# Patient Record
Sex: FEMALE | Race: WHITE | NOT HISPANIC OR LATINO | Employment: UNEMPLOYED | URBAN - METROPOLITAN AREA
[De-identification: names, ages, dates, MRNs, and addresses within clinical notes are randomized per-mention and may not be internally consistent; named-entity substitution may affect disease eponyms.]

---

## 2018-03-05 DIAGNOSIS — E03.9 HYPOTHYROIDISM, UNSPECIFIED TYPE: Primary | ICD-10-CM

## 2018-03-05 RX ORDER — SPIRONOLACTONE 100 MG/1
100 TABLET, FILM COATED ORAL
COMMUNITY
Start: 2017-10-09 | End: 2018-07-19 | Stop reason: SDUPTHER

## 2018-03-05 RX ORDER — FLUTICASONE PROPIONATE 50 MCG
SPRAY, SUSPENSION (ML) NASAL
COMMUNITY
Start: 2012-06-14 | End: 2021-01-18 | Stop reason: ENTERED-IN-ERROR

## 2018-03-05 RX ORDER — CETIRIZINE HYDROCHLORIDE 10 MG/1
10 TABLET ORAL
COMMUNITY
Start: 2017-10-03 | End: 2019-03-12 | Stop reason: ENTERED-IN-ERROR

## 2018-03-05 RX ORDER — ASPIRIN 81 MG/1
81 TABLET ORAL
COMMUNITY
Start: 2016-01-13 | End: 2018-10-09 | Stop reason: ENTERED-IN-ERROR

## 2018-03-05 RX ORDER — LEVOTHYROXINE SODIUM 175 UG/1
TABLET ORAL
COMMUNITY
Start: 2017-10-09 | End: 2018-03-06 | Stop reason: SDUPTHER

## 2018-03-05 RX ORDER — ACETAMINOPHEN 500 MG
TABLET ORAL
COMMUNITY
Start: 2011-02-23 | End: 2019-03-12 | Stop reason: ENTERED-IN-ERROR

## 2018-03-06 RX ORDER — LEVOTHYROXINE SODIUM 175 UG/1
175 TABLET ORAL DAILY
Qty: 90 TABLET | Refills: 1 | Status: CANCELLED | OUTPATIENT
Start: 2018-03-06

## 2018-03-06 RX ORDER — LEVOTHYROXINE SODIUM 175 UG/1
175 TABLET ORAL DAILY
Qty: 90 TABLET | Refills: 1 | Status: SHIPPED | OUTPATIENT
Start: 2018-03-06 | End: 2018-10-16 | Stop reason: SDUPTHER

## 2018-07-24 ENCOUNTER — TELEPHONE (OUTPATIENT)
Dept: FAMILY MEDICINE | Facility: CLINIC | Age: 53
End: 2018-07-24

## 2018-07-24 NOTE — TELEPHONE ENCOUNTER
Pt LVM, pt needs a refill of medication for spironolactone (ALDACTONE) 100 mg tablet, medication was refill today

## 2018-10-09 ENCOUNTER — OFFICE VISIT (OUTPATIENT)
Dept: FAMILY MEDICINE | Facility: CLINIC | Age: 53
End: 2018-10-09
Payer: COMMERCIAL

## 2018-10-09 VITALS
WEIGHT: 183.4 LBS | TEMPERATURE: 98.7 F | BODY MASS INDEX: 32.5 KG/M2 | DIASTOLIC BLOOD PRESSURE: 72 MMHG | HEIGHT: 63 IN | OXYGEN SATURATION: 98 % | RESPIRATION RATE: 16 BRPM | SYSTOLIC BLOOD PRESSURE: 120 MMHG | HEART RATE: 68 BPM

## 2018-10-09 DIAGNOSIS — Z11.59 NEED FOR HEPATITIS C SCREENING TEST: ICD-10-CM

## 2018-10-09 DIAGNOSIS — Z23 NEED FOR VACCINATION: ICD-10-CM

## 2018-10-09 DIAGNOSIS — L70.8 OTHER ACNE: ICD-10-CM

## 2018-10-09 DIAGNOSIS — M25.551 PAIN OF RIGHT HIP JOINT: ICD-10-CM

## 2018-10-09 DIAGNOSIS — Z00.00 ROUTINE PHYSICAL EXAMINATION: Primary | ICD-10-CM

## 2018-10-09 DIAGNOSIS — E06.3 HYPOTHYROIDISM DUE TO HASHIMOTO'S THYROIDITIS: ICD-10-CM

## 2018-10-09 PROCEDURE — 90471 IMMUNIZATION ADMIN: CPT | Performed by: FAMILY MEDICINE

## 2018-10-09 PROCEDURE — 99396 PREV VISIT EST AGE 40-64: CPT | Mod: 25 | Performed by: FAMILY MEDICINE

## 2018-10-09 PROCEDURE — 90686 IIV4 VACC NO PRSV 0.5 ML IM: CPT | Performed by: FAMILY MEDICINE

## 2018-10-09 ASSESSMENT — ENCOUNTER SYMPTOMS
CHEST TIGHTNESS: 0
PALPITATIONS: 0
SORE THROAT: 0
ABDOMINAL PAIN: 0
MYALGIAS: 0
FEVER: 0
WHEEZING: 0
ARTHRALGIAS: 0
COUGH: 0
NERVOUS/ANXIOUS: 0
EYE PAIN: 0
DIZZINESS: 0
SINUS PRESSURE: 0
RHINORRHEA: 0
SINUS PAIN: 0
FATIGUE: 0
CHILLS: 0
HEMATURIA: 0
DIARRHEA: 0
WEAKNESS: 0
CONSTIPATION: 0
SHORTNESS OF BREATH: 0
FREQUENCY: 0
BRUISES/BLEEDS EASILY: 0
HEADACHES: 0

## 2018-10-09 NOTE — PROGRESS NOTES
Subjective      Patient ID: Arelis Chin is a 52 y.o. female.    She is here for PE. She has been doing ok overall. She is trying to follow well balanced meals and having hard time losing weight and thinks she it is related to her hormones but cannot stop her oral hormones. She is also planning to schedule colonoscopy in near future as well. She is sleeping 7hrs per night. Normal BMs and urination. UTD with eye and dental exam. Due for labs. Would like flu shot.         The following have been reviewed and updated as appropriate in this visit:  Allergies  Meds  Problems       Review of Systems   Constitutional: Negative for chills, fatigue and fever.        Weight gain   HENT: Negative for ear pain, postnasal drip, rhinorrhea, sinus pain, sinus pressure and sore throat.    Eyes: Negative for pain and visual disturbance.   Respiratory: Negative for cough, chest tightness, shortness of breath and wheezing.    Cardiovascular: Negative for chest pain and palpitations.   Gastrointestinal: Negative for abdominal pain, constipation and diarrhea.   Genitourinary: Negative for decreased urine volume, frequency and hematuria.   Musculoskeletal: Negative for arthralgias and myalgias.   Skin: Negative for rash.   Neurological: Negative for dizziness, weakness and headaches.   Hematological: Does not bruise/bleed easily.   Psychiatric/Behavioral: Negative for behavioral problems. The patient is not nervous/anxious.        Current Outpatient Prescriptions   Medication Sig Dispense Refill   • cetirizine (ZyrTEC) 10 mg tablet 10 mg.     • cholecalciferol, vitamin D3, (VITAMIN D3) 5,000 unit tablet take 1 capsule as needed     • conj estrog-medroxyprogest ace (PREMPRO) 0.45-1.5 mg per tablet take 1 tablet by oral route  every day     • fluticasone (FLONASE) 50 mcg/actuation nasal spray spray 2 spray by intranasal route  every day in each nostril- PRN     • levothyroxine (SYNTHROID) 175 mcg tablet Take 1 tablet (175 mcg  "total) by mouth daily. 90 tablet 1   • spironolactone (ALDACTONE) 100 mg tablet TAKE 1 TABLET EVERY DAY 90 tablet 0     No current facility-administered medications for this visit.      History reviewed. No pertinent past medical history.  History reviewed. No pertinent family history.  Past Surgical History:   Procedure Laterality Date   • LASER ABLATION OF THE CERVIX       Social History     Social History   • Marital status:      Spouse name: N/A   • Number of children: N/A   • Years of education: N/A     Occupational History   • Not on file.     Social History Main Topics   • Smoking status: Never Smoker   • Smokeless tobacco: Never Used   • Alcohol use Yes      Comment: 2-3 weely    • Drug use: Unknown   • Sexual activity: Not on file     Other Topics Concern   • Not on file     Social History Narrative   • No narrative on file     Allergies   Allergen Reactions   • Hydromorphone      Other reaction(s): eye tearing   • Sulfamethoxazole Hives   • Trimethoprim Hives       Objective   /72 (BP Location: Right upper arm, Patient Position: Sitting)   Pulse 68   Temp 37.1 °C (98.7 °F) (Oral)   Resp 16   Ht 1.6 m (5' 3\")   Wt 83.2 kg (183 lb 6.4 oz)   SpO2 98%   BMI 32.49 kg/m²   Physical Exam   Constitutional: She is oriented to person, place, and time. She appears well-developed and well-nourished.   HENT:   Head: Normocephalic and atraumatic.   Right Ear: External ear normal.   Left Ear: External ear normal.   Eyes: Conjunctivae are normal. Pupils are equal, round, and reactive to light.   Neck: Normal range of motion. Neck supple. No thyromegaly present.   Cardiovascular: Normal rate, regular rhythm and normal heart sounds.    No murmur heard.  Pulmonary/Chest: Effort normal and breath sounds normal. She has no wheezes.   Abdominal: Soft. Bowel sounds are normal. There is no tenderness.   Musculoskeletal: Normal range of motion. She exhibits no tenderness.   Lymphadenopathy:     She has no " cervical adenopathy.   Neurological: She is alert and oriented to person, place, and time. No cranial nerve deficit.   Skin: Skin is warm and dry. No rash noted.   Psychiatric: She has a normal mood and affect.   Nursing note and vitals reviewed.      Assessment/Plan   Problem List Items Addressed This Visit     Hypothyroidism     Stable. She feels her TSH could be off and will check updated labs and see how her levels are.          Relevant Orders    TSH 3rd Generation    T4, free    Other acne     Stable. Cont med.            Other Visit Diagnoses     Routine physical examination    -  Primary    Overdue for fasting labs    Relevant Orders    Comprehensive metabolic panel    Lipid panel    CBC    Urinalysis with Reflex Culture    Need for hepatitis C screening test        Will check hep c    Relevant Orders    Hepatitis C antibody    Pain of right hip joint        Roney check labs    Relevant Orders    AL    Rheumatoid factor    Lyme EIA reflex WB    Sedimentation rate    C-reactive protein    Need for vaccination        Relevant Orders    Influenza vaccine quadrivalent preservative free 6 mon and older IM (FluLaval)      PE- Pt doing well overall. Discussed diet and exercise. UTD with eye and dental exam. Will check fasting labs. Will get flu shot. UTD with gyn care. Will set up colonoscopy soon.     Eloisa Domingo, DO  10/9/2018

## 2018-10-12 ENCOUNTER — TELEPHONE (OUTPATIENT)
Dept: FAMILY MEDICINE | Facility: CLINIC | Age: 53
End: 2018-10-12

## 2018-10-12 LAB
ALBUMIN SERPL-MCNC: 4.4 G/DL (ref 3.6–5.1)
ALBUMIN/GLOB SERPL: 1.7 (CALC) (ref 1–2.5)
ALP SERPL-CCNC: 95 U/L (ref 33–130)
ALT SERPL-CCNC: 19 U/L (ref 6–29)
ANA SER QL IF: NEGATIVE
APPEARANCE UR: CLEAR
AST SERPL-CCNC: 15 U/L (ref 10–35)
B BURGDOR AB SER IA-ACNC: <0.9 INDEX
BILIRUB SERPL-MCNC: 0.7 MG/DL (ref 0.2–1.2)
BILIRUB UR QL STRIP: NEGATIVE
BUN SERPL-MCNC: 11 MG/DL (ref 7–25)
BUN/CREAT SERPL: NORMAL (CALC) (ref 6–22)
CALCIUM SERPL-MCNC: 9.1 MG/DL (ref 8.6–10.4)
CHLORIDE SERPL-SCNC: 104 MMOL/L (ref 98–110)
CHOLEST SERPL-MCNC: 204 MG/DL
CHOLEST/HDLC SERPL: 3.7 (CALC)
CO2 SERPL-SCNC: 25 MMOL/L (ref 20–32)
COLOR UR: YELLOW
CREAT SERPL-MCNC: 0.86 MG/DL (ref 0.5–1.05)
CRP SERPL-MCNC: 12.8 MG/L
ERYTHROCYTE [DISTWIDTH] IN BLOOD BY AUTOMATED COUNT: 11.8 % (ref 11–15)
ERYTHROCYTE [SEDIMENTATION RATE] IN BLOOD BY WESTERGREN METHOD: 6 MM/H
GFR SERPL CREATININE-BSD FRML MDRD: 77 ML/MIN/1.73M2
GLOBULIN SER CALC-MCNC: 2.6 G/DL (CALC) (ref 1.9–3.7)
GLUCOSE SERPL-MCNC: 92 MG/DL (ref 65–99)
GLUCOSE UR QL STRIP: NEGATIVE
HCT VFR BLD AUTO: 42.1 % (ref 35–45)
HCV AB S/CO SERPL IA: 0.01
HCV AB SERPL QL IA: NORMAL
HDLC SERPL-MCNC: 55 MG/DL
HGB BLD-MCNC: 14.9 G/DL (ref 11.7–15.5)
HGB UR QL STRIP: NEGATIVE
KETONES UR QL STRIP: NEGATIVE
LDLC SERPL CALC-MCNC: 123 MG/DL (CALC)
LEUKOCYTE ESTERASE UR QL STRIP: NEGATIVE
MCH RBC QN AUTO: 32.1 PG (ref 27–33)
MCHC RBC AUTO-ENTMCNC: 35.4 G/DL (ref 32–36)
MCV RBC AUTO: 90.7 FL (ref 80–100)
NITRITE UR QL STRIP: NEGATIVE
NONHDLC SERPL-MCNC: 149 MG/DL (CALC)
PH UR STRIP: 5.5 [PH] (ref 5–8)
PLATELET # BLD AUTO: 203 THOUSAND/UL (ref 140–400)
PMV BLD REES-ECKER: 10.7 FL (ref 7.5–12.5)
POTASSIUM SERPL-SCNC: 4.3 MMOL/L (ref 3.5–5.3)
PROT SERPL-MCNC: 7 G/DL (ref 6.1–8.1)
PROT UR QL STRIP: NEGATIVE
RBC # BLD AUTO: 4.64 MILLION/UL (ref 3.8–5.1)
RHEUMATOID FACT SERPL-ACNC: <14 IU/ML
SODIUM SERPL-SCNC: 137 MMOL/L (ref 135–146)
SP GR UR STRIP: 1.01 (ref 1–1.03)
T4 FREE SERPL-MCNC: 1.5 NG/DL (ref 0.8–1.8)
TRIGL SERPL-MCNC: 144 MG/DL
TSH SERPL-ACNC: 0.92 MIU/L
WBC # BLD AUTO: 6.5 THOUSAND/UL (ref 3.8–10.8)

## 2018-10-12 NOTE — TELEPHONE ENCOUNTER
Left detailed message for patient, she is to call back with any questions        ----- Message from Eloisa Domingo,  sent at 10/11/2018  3:28 PM EDT -----  Please let her know labs showing TSH is normal at 0.92 so milton continue current dose and her total cholesterol 204 goal <200, TGs 144 goal <150, HDL 55 goal >50 and  goal <110 and rest of labs were ok but one of the test called crp- c-reactive protein (inflamm marker) was alittle elevated at 12 goal <8 but this is nonspecific and nothing we can do at this time but she how she feels moving forward and will recheck the level in 6 months or so. She is to continue to follow well balanced meals and try to stay active few times a wk.

## 2018-10-16 ENCOUNTER — TELEPHONE (OUTPATIENT)
Dept: FAMILY MEDICINE | Facility: CLINIC | Age: 53
End: 2018-10-16

## 2018-10-16 DIAGNOSIS — E03.9 HYPOTHYROIDISM, UNSPECIFIED TYPE: ICD-10-CM

## 2018-10-16 RX ORDER — SPIRONOLACTONE 100 MG/1
100 TABLET, FILM COATED ORAL
Qty: 90 TABLET | Refills: 1 | Status: SHIPPED | OUTPATIENT
Start: 2018-10-16 | End: 2018-10-17 | Stop reason: SDUPTHER

## 2018-10-16 RX ORDER — LEVOTHYROXINE SODIUM 175 UG/1
175 TABLET ORAL DAILY
Qty: 90 TABLET | Refills: 1 | Status: SHIPPED | OUTPATIENT
Start: 2018-10-16 | End: 2018-10-17 | Stop reason: SDUPTHER

## 2018-10-16 NOTE — TELEPHONE ENCOUNTER
Patient is calling for lab results. She is asking someone call ehr back to discuss today. They are in the system and signed off on with notes from Dr. Domingo. You can reach her at [phone]

## 2018-10-16 NOTE — TELEPHONE ENCOUNTER
Patient is aware of her lab results and to recheck in 6 months , she needs a refill on her medications to be sent to [facility]

## 2018-10-17 DIAGNOSIS — E03.9 HYPOTHYROIDISM, UNSPECIFIED TYPE: ICD-10-CM

## 2018-10-17 RX ORDER — LEVOTHYROXINE SODIUM 175 UG/1
175 TABLET ORAL DAILY
Qty: 90 TABLET | Refills: 1 | Status: SHIPPED | OUTPATIENT
Start: 2018-10-17 | End: 2019-03-12 | Stop reason: SDUPTHER

## 2018-10-17 RX ORDER — SPIRONOLACTONE 100 MG/1
100 TABLET, FILM COATED ORAL
Qty: 90 TABLET | Refills: 1 | Status: SHIPPED | OUTPATIENT
Start: 2018-10-17 | End: 2019-03-12 | Stop reason: SDUPTHER

## 2018-12-07 ENCOUNTER — TRANSCRIBE ORDERS (OUTPATIENT)
Dept: SCHEDULING | Age: 53
End: 2018-12-07

## 2018-12-07 DIAGNOSIS — Z12.31 ENCOUNTER FOR SCREENING MAMMOGRAM FOR MALIGNANT NEOPLASM OF BREAST: Primary | ICD-10-CM

## 2018-12-13 ENCOUNTER — HOSPITAL ENCOUNTER (OUTPATIENT)
Dept: RADIOLOGY | Facility: HOSPITAL | Age: 53
Discharge: HOME | End: 2018-12-13
Attending: OBSTETRICS & GYNECOLOGY
Payer: COMMERCIAL

## 2018-12-13 DIAGNOSIS — Z12.31 ENCOUNTER FOR SCREENING MAMMOGRAM FOR MALIGNANT NEOPLASM OF BREAST: ICD-10-CM

## 2018-12-13 PROCEDURE — 77063 BREAST TOMOSYNTHESIS BI: CPT

## 2019-03-12 ENCOUNTER — OFFICE VISIT (OUTPATIENT)
Dept: FAMILY MEDICINE | Facility: CLINIC | Age: 54
End: 2019-03-12
Payer: COMMERCIAL

## 2019-03-12 VITALS
SYSTOLIC BLOOD PRESSURE: 122 MMHG | WEIGHT: 183.4 LBS | RESPIRATION RATE: 16 BRPM | OXYGEN SATURATION: 98 % | HEIGHT: 63 IN | HEART RATE: 66 BPM | BODY MASS INDEX: 32.5 KG/M2 | TEMPERATURE: 98.7 F | DIASTOLIC BLOOD PRESSURE: 80 MMHG

## 2019-03-12 DIAGNOSIS — E03.9 HYPOTHYROIDISM, UNSPECIFIED TYPE: ICD-10-CM

## 2019-03-12 DIAGNOSIS — L70.9 ACNE, UNSPECIFIED ACNE TYPE: Primary | ICD-10-CM

## 2019-03-12 PROCEDURE — 99213 OFFICE O/P EST LOW 20 MIN: CPT | Performed by: FAMILY MEDICINE

## 2019-03-12 RX ORDER — LEVOTHYROXINE SODIUM 175 UG/1
175 TABLET ORAL DAILY
Qty: 90 TABLET | Refills: 1 | Status: SHIPPED | OUTPATIENT
Start: 2019-03-12 | End: 2019-08-30 | Stop reason: SDUPTHER

## 2019-03-12 RX ORDER — SPIRONOLACTONE 100 MG/1
100 TABLET, FILM COATED ORAL
Qty: 90 TABLET | Refills: 1 | Status: SHIPPED | OUTPATIENT
Start: 2019-03-12 | End: 2019-08-30 | Stop reason: SDUPTHER

## 2019-03-12 ASSESSMENT — ENCOUNTER SYMPTOMS
EYE PAIN: 0
WEAKNESS: 0
WHEEZING: 0
SINUS PRESSURE: 0
SLEEP DISTURBANCE: 0
ABDOMINAL PAIN: 0
BRUISES/BLEEDS EASILY: 0
SORE THROAT: 0
MYALGIAS: 1
JOINT SWELLING: 1
NERVOUS/ANXIOUS: 0
CONSTIPATION: 0
PALPITATIONS: 0
FATIGUE: 1
FEVER: 0
HEMATURIA: 0
DIARRHEA: 0
SINUS PAIN: 0
RHINORRHEA: 0
SHORTNESS OF BREATH: 0
CHEST TIGHTNESS: 0
ARTHRALGIAS: 1
FREQUENCY: 0
HEADACHES: 0
DIZZINESS: 0
CHILLS: 0
COUGH: 0

## 2019-03-12 NOTE — PROGRESS NOTES
Subjective      Patient ID: Arelis Chin is a 53 y.o. female.    She is here for med check. She is doing ok overall and trying to follow well balanced meals. Stays well hydrated. She is not doing any exercising b/c she is so busy with her 4 children. She does have fatigue along with joint pains and some swelling of knees as well. She did have labs done back in Oct and was ok but she is questioning if she should see rheumo or not. She knows she should be more active and that may help. She does feel R thyroid nodule could be getting bigger.         The following have been reviewed and updated as appropriate in this visit:  Tobacco  Allergies  Meds  Problems  Med Hx  Surg Hx  Fam Hx       Review of Systems   Constitutional: Positive for fatigue. Negative for chills and fever.   HENT: Negative for ear pain, postnasal drip, rhinorrhea, sinus pain, sinus pressure and sore throat.    Eyes: Negative for pain and visual disturbance.   Respiratory: Negative for cough, chest tightness, shortness of breath and wheezing.    Cardiovascular: Negative for chest pain and palpitations.   Gastrointestinal: Negative for abdominal pain, constipation and diarrhea.   Genitourinary: Negative for decreased urine volume, frequency and hematuria.   Musculoskeletal: Positive for arthralgias, joint swelling and myalgias.   Skin: Negative for rash.   Neurological: Negative for dizziness, weakness and headaches.   Hematological: Does not bruise/bleed easily.   Psychiatric/Behavioral: Negative for behavioral problems, sleep disturbance and suicidal ideas. The patient is not nervous/anxious.        Current Outpatient Prescriptions   Medication Sig Dispense Refill   • conj estrog-medroxyprogest ace (PREMPRO) 0.45-1.5 mg per tablet take 1 tablet by oral route  every day     • levothyroxine (SYNTHROID) 175 mcg tablet Take 1 tablet (175 mcg total) by mouth daily. 90 tablet 1   • spironolactone (ALDACTONE) 100 mg tablet Take 1 tablet (100  "mg total) by mouth once daily. 90 tablet 1   • fluticasone (FLONASE) 50 mcg/actuation nasal spray spray 2 spray by intranasal route  every day in each nostril- PRN       No current facility-administered medications for this visit.      Past Medical History:   Diagnosis Date   • Acne    • Hypothyroidism    • Seasonal allergies      Family History   Problem Relation Age of Onset   • Diabetes Mother    • Kidney disease Mother    • Heart disease Father    • Breast cancer Sister      Past Surgical History:   Procedure Laterality Date   • LASER ABLATION OF THE CERVIX       Social History     Social History   • Marital status:      Spouse name: N/A   • Number of children: N/A   • Years of education: N/A     Occupational History   • Not on file.     Social History Main Topics   • Smoking status: Never Smoker   • Smokeless tobacco: Never Used   • Alcohol use Yes      Comment: 2-3 weely    • Drug use: Unknown   • Sexual activity: Not on file     Other Topics Concern   • Not on file     Social History Narrative   • No narrative on file     Allergies   Allergen Reactions   • Hydromorphone      Other reaction(s): eye tearing   • Sulfamethoxazole Hives   • Trimethoprim Hives       Objective   /80 (BP Location: Right upper arm, Patient Position: Sitting)   Pulse 66   Temp 37.1 °C (98.7 °F) (Oral)   Resp 16   Ht 1.6 m (5' 3\")   Wt 83.2 kg (183 lb 6.4 oz)   SpO2 98%   BMI 32.49 kg/m²   Physical Exam   Constitutional: She is oriented to person, place, and time. She appears well-developed and well-nourished.   HENT:   Head: Normocephalic and atraumatic.   Right Ear: External ear normal.   Left Ear: External ear normal.   Eyes: Conjunctivae are normal. Pupils are equal, round, and reactive to light.   Neck: Normal range of motion. Neck supple. No thyromegaly present.   Cardiovascular: Normal rate, regular rhythm and normal heart sounds.    No murmur heard.  Pulmonary/Chest: Effort normal and breath sounds normal. " She has no wheezes.   Abdominal: Soft. Bowel sounds are normal. There is no tenderness.   Musculoskeletal: Normal range of motion. She exhibits no tenderness.   Lymphadenopathy:     She has no cervical adenopathy.   Neurological: She is alert and oriented to person, place, and time. No cranial nerve deficit.   Skin: Skin is warm and dry. No rash noted.   Psychiatric: She has a normal mood and affect.   Nursing note and vitals reviewed.      Assessment/Plan   Problem List Items Addressed This Visit        Other    Hypothyroidism     She is doing ok overall but we need to recheck updated labs and thyroid US and see where we are.          Relevant Medications    levothyroxine (SYNTHROID) 175 mcg tablet    Other Relevant Orders    ULTRASOUND THYROID    TSH 3rd Generation    T4, free      Other Visit Diagnoses     Acne, unspecified acne type    -  Primary    Stable. Will cont med.     Relevant Medications    spironolactone (ALDACTONE) 100 mg tablet      She is planning to walk more and see how she does and set up colonoscopy for summer. She will have recheck physical 6 months.     Eloisa Domingo, DO  3/12/2019

## 2019-03-20 ENCOUNTER — HOSPITAL ENCOUNTER (OUTPATIENT)
Dept: RADIOLOGY | Age: 54
Discharge: HOME | End: 2019-03-20
Attending: FAMILY MEDICINE
Payer: COMMERCIAL

## 2019-03-20 DIAGNOSIS — E03.9 HYPOTHYROIDISM, UNSPECIFIED TYPE: ICD-10-CM

## 2019-03-20 PROCEDURE — 76536 US EXAM OF HEAD AND NECK: CPT

## 2019-03-21 LAB
T4 FREE SERPL-MCNC: 1.7 NG/DL (ref 0.8–1.8)
TSH SERPL-ACNC: 0.89 MIU/L

## 2019-03-22 ENCOUNTER — TELEPHONE (OUTPATIENT)
Dept: FAMILY MEDICINE | Facility: CLINIC | Age: 54
End: 2019-03-22

## 2019-03-22 NOTE — TELEPHONE ENCOUNTER
----- Message from Eloisa Domingo, DO sent at 3/21/2019  3:03 PM EDT -----  Please let her know thyroid labs are coming back looking good and cont her current dosage and recheck in 6 months

## 2019-07-01 LAB — HM COLONOSCOPY: NORMAL

## 2019-08-30 DIAGNOSIS — E03.9 HYPOTHYROIDISM, UNSPECIFIED TYPE: ICD-10-CM

## 2019-08-30 DIAGNOSIS — L70.9 ACNE, UNSPECIFIED ACNE TYPE: ICD-10-CM

## 2019-08-30 RX ORDER — SPIRONOLACTONE 100 MG/1
TABLET, FILM COATED ORAL
Qty: 90 TABLET | Refills: 1 | Status: SHIPPED | OUTPATIENT
Start: 2019-08-30 | End: 2020-02-26

## 2019-08-30 RX ORDER — LEVOTHYROXINE SODIUM 175 UG/1
TABLET ORAL
Qty: 90 TABLET | Refills: 1 | Status: SHIPPED | OUTPATIENT
Start: 2019-08-30 | End: 2020-02-26

## 2020-01-13 ENCOUNTER — OFFICE VISIT (OUTPATIENT)
Dept: FAMILY MEDICINE | Facility: CLINIC | Age: 55
End: 2020-01-13
Payer: COMMERCIAL

## 2020-01-13 VITALS
OXYGEN SATURATION: 99 % | BODY MASS INDEX: 30.02 KG/M2 | DIASTOLIC BLOOD PRESSURE: 80 MMHG | WEIGHT: 169.4 LBS | RESPIRATION RATE: 16 BRPM | HEART RATE: 75 BPM | SYSTOLIC BLOOD PRESSURE: 120 MMHG | HEIGHT: 63 IN | TEMPERATURE: 97.6 F

## 2020-01-13 DIAGNOSIS — E06.3 HYPOTHYROIDISM DUE TO HASHIMOTO'S THYROIDITIS: ICD-10-CM

## 2020-01-13 DIAGNOSIS — Z00.00 ROUTINE PHYSICAL EXAMINATION: Primary | ICD-10-CM

## 2020-01-13 DIAGNOSIS — L70.8 OTHER ACNE: ICD-10-CM

## 2020-01-13 PROCEDURE — 99396 PREV VISIT EST AGE 40-64: CPT | Performed by: FAMILY MEDICINE

## 2020-01-13 RX ORDER — ESTRADIOL 0.1 MG/G
CREAM VAGINAL
COMMUNITY
Start: 2019-10-29 | End: 2021-01-18 | Stop reason: ENTERED-IN-ERROR

## 2020-01-13 ASSESSMENT — ENCOUNTER SYMPTOMS
SINUS PAIN: 0
WHEEZING: 0
RHINORRHEA: 0
SINUS PRESSURE: 0
DIARRHEA: 0
COUGH: 0
SHORTNESS OF BREATH: 0
PALPITATIONS: 0
FEVER: 0
SORE THROAT: 0
HEMATURIA: 0
ARTHRALGIAS: 0
CHEST TIGHTNESS: 0
CHILLS: 0
HEADACHES: 0
CONSTIPATION: 0
DIZZINESS: 0
FATIGUE: 1
BRUISES/BLEEDS EASILY: 0
MYALGIAS: 0
FREQUENCY: 0
WEAKNESS: 0
NERVOUS/ANXIOUS: 0
ABDOMINAL PAIN: 0
EYE PAIN: 0

## 2020-01-16 LAB
ALBUMIN SERPL-MCNC: 4.4 G/DL (ref 3.6–5.1)
ALBUMIN/GLOB SERPL: 1.8 (CALC) (ref 1–2.5)
ALP SERPL-CCNC: 94 U/L (ref 33–130)
ALT SERPL-CCNC: 11 U/L (ref 6–29)
AST SERPL-CCNC: 13 U/L (ref 10–35)
BILIRUB SERPL-MCNC: 0.7 MG/DL (ref 0.2–1.2)
BUN SERPL-MCNC: 15 MG/DL (ref 7–25)
BUN/CREAT SERPL: NORMAL (CALC) (ref 6–22)
CALCIUM SERPL-MCNC: 9.7 MG/DL (ref 8.6–10.4)
CHLORIDE SERPL-SCNC: 106 MMOL/L (ref 98–110)
CHOLEST SERPL-MCNC: 185 MG/DL
CHOLEST/HDLC SERPL: 3.4 (CALC)
CO2 SERPL-SCNC: 27 MMOL/L (ref 20–32)
CREAT SERPL-MCNC: 0.96 MG/DL (ref 0.5–1.05)
ERYTHROCYTE [DISTWIDTH] IN BLOOD BY AUTOMATED COUNT: 12.3 % (ref 11–15)
GLOBULIN SER CALC-MCNC: 2.4 G/DL (CALC) (ref 1.9–3.7)
GLUCOSE SERPL-MCNC: 95 MG/DL (ref 65–99)
HCT VFR BLD AUTO: 44 % (ref 35–45)
HDLC SERPL-MCNC: 55 MG/DL
HGB BLD-MCNC: 14.9 G/DL (ref 11.7–15.5)
LDLC SERPL CALC-MCNC: 108 MG/DL (CALC)
MCH RBC QN AUTO: 30.7 PG (ref 27–33)
MCHC RBC AUTO-ENTMCNC: 33.9 G/DL (ref 32–36)
MCV RBC AUTO: 90.7 FL (ref 80–100)
NONHDLC SERPL-MCNC: 130 MG/DL (CALC)
PLATELET # BLD AUTO: 219 THOUSAND/UL (ref 140–400)
PMV BLD REES-ECKER: 11.2 FL (ref 7.5–12.5)
POTASSIUM SERPL-SCNC: 4.5 MMOL/L (ref 3.5–5.3)
PROT SERPL-MCNC: 6.8 G/DL (ref 6.1–8.1)
QUEST EGFR NON-AFR. AMERICAN: 67 ML/MIN/1.73M2
RBC # BLD AUTO: 4.85 MILLION/UL (ref 3.8–5.1)
SODIUM SERPL-SCNC: 139 MMOL/L (ref 135–146)
T4 FREE SERPL-MCNC: 1.5 NG/DL (ref 0.8–1.8)
TRIGL SERPL-MCNC: 110 MG/DL
TSH SERPL-ACNC: 0.43 MIU/L
WBC # BLD AUTO: 5.5 THOUSAND/UL (ref 3.8–10.8)

## 2020-01-21 ENCOUNTER — TELEPHONE (OUTPATIENT)
Dept: FAMILY MEDICINE | Facility: CLINIC | Age: 55
End: 2020-01-21

## 2020-01-21 NOTE — TELEPHONE ENCOUNTER
swp she is aware all her fasting labs are coming back normal including thyroid and total cholesterol is better along with her TGs and LDL so she is doing well with the diet and exercise and keep up the good work and will recheck labs in 1yr      ----- Message from Eloisa Domingo DO sent at 1/16/2020  9:24 PM EST -----  Please let her know all her fasting labs are coming back normal including thyroid and total cholesterol is better along with her TGs and LDL so she is doing well with the diet and exercise and keep up the good work and will recheck labs in 1yr

## 2020-09-14 ENCOUNTER — TRANSCRIBE ORDERS (OUTPATIENT)
Dept: SCHEDULING | Age: 55
End: 2020-09-14

## 2020-09-14 DIAGNOSIS — Z12.31 ENCOUNTER FOR SCREENING MAMMOGRAM FOR MALIGNANT NEOPLASM OF BREAST: Primary | ICD-10-CM

## 2020-09-24 ENCOUNTER — HOSPITAL ENCOUNTER (OUTPATIENT)
Dept: RADIOLOGY | Facility: CLINIC | Age: 55
Discharge: HOME | End: 2020-09-24
Attending: OBSTETRICS & GYNECOLOGY
Payer: COMMERCIAL

## 2020-09-24 DIAGNOSIS — Z12.31 ENCOUNTER FOR SCREENING MAMMOGRAM FOR MALIGNANT NEOPLASM OF BREAST: ICD-10-CM

## 2020-09-24 PROCEDURE — 77067 SCR MAMMO BI INCL CAD: CPT

## 2020-09-25 ENCOUNTER — TRANSCRIBE ORDERS (OUTPATIENT)
Dept: REGISTRATION | Facility: CLINIC | Age: 55
End: 2020-09-25

## 2020-09-25 DIAGNOSIS — R92.8 OTHER ABNORMAL AND INCONCLUSIVE FINDINGS ON DIAGNOSTIC IMAGING OF BREAST: Primary | ICD-10-CM

## 2020-09-28 ENCOUNTER — HOSPITAL ENCOUNTER (OUTPATIENT)
Dept: RADIOLOGY | Facility: CLINIC | Age: 55
Discharge: HOME | End: 2020-09-28
Attending: RADIOLOGY
Payer: COMMERCIAL

## 2020-09-28 DIAGNOSIS — R92.8 OTHER ABNORMAL AND INCONCLUSIVE FINDINGS ON DIAGNOSTIC IMAGING OF BREAST: ICD-10-CM

## 2020-09-28 PROCEDURE — 76642 ULTRASOUND BREAST LIMITED: CPT | Mod: LT

## 2020-09-28 PROCEDURE — G0279 TOMOSYNTHESIS, MAMMO: HCPCS | Mod: LT

## 2020-10-08 NOTE — PROGRESS NOTES
Subjective      Patient ID: Arelis Chin is a 54 y.o. female.    She is here for physical. Pt has been doing well overall. She has lost ab out 20lbs so far. She is trying to follow well balanced meals. Stays well hydrated. She is exercising 4-5x wk 1hr cardio. Average 7hrs of sleep a night. Normal BMs and urination. UTD with colonoscopy. UTD with eye and dental exam. UTD with immunizations. UTD with gyn care. Due for fasting labs. Postmenopausal and would like to think about dexa in few yrs.       The following have been reviewed and updated as appropriate in this visit:  Tobacco  Allergies  Meds  Problems  Med Hx  Surg Hx  Fam Hx       Review of Systems   Constitutional: Positive for fatigue. Negative for chills and fever.   HENT: Negative for ear pain, postnasal drip, rhinorrhea, sinus pressure, sinus pain and sore throat.    Eyes: Negative for pain and visual disturbance.   Respiratory: Negative for cough, chest tightness, shortness of breath and wheezing.    Cardiovascular: Negative for chest pain and palpitations.   Gastrointestinal: Negative for abdominal pain, constipation and diarrhea.   Genitourinary: Negative for decreased urine volume, frequency and hematuria.   Musculoskeletal: Negative for arthralgias and myalgias.   Skin: Negative for rash.   Neurological: Negative for dizziness, weakness and headaches.   Hematological: Does not bruise/bleed easily.   Psychiatric/Behavioral: Negative for behavioral problems. The patient is not nervous/anxious.        Current Outpatient Medications   Medication Sig Dispense Refill   • conj estrog-medroxyprogest ace (PREMPRO) 0.45-1.5 mg per tablet take 1 tablet by oral route  every day     • estradiol (ESTRACE) 0.01 % (0.1 mg/gram) vaginal cream      • fluticasone (FLONASE) 50 mcg/actuation nasal spray spray 2 spray by intranasal route  every day in each nostril- PRN     • spironolactone (ALDACTONE) 100 mg tablet TAKE 1 TABLET ONCE DAILY 90 tablet 1   •  Problem: Skin Integrity:  Goal: Absence of new skin breakdown  Description: Absence of new skin breakdown  Outcome: Met This Shift     Problem: Falls - Risk of:  Goal: Will remain free from falls  Description: Will remain free from falls  Outcome: Met This Shift  Goal: Absence of physical injury  Description: Absence of physical injury  Outcome: Met This Shift     Problem: Pain:  Goal: Pain level will decrease  Description: Pain level will decrease  Outcome: Met This Shift  Goal: Control of acute pain  Description: Control of acute pain  Outcome: Met This Shift     Problem: Restraint Use - Nonviolent/Non-Self-Destructive Behavior:  Goal: Absence of restraint-related injury  Description: Absence of restraint-related injury  Outcome: Met This Shift     Problem: Skin Integrity:  Goal: Will show no infection signs and symptoms  Description: Will show no infection signs and symptoms  Outcome: Not Met This Shift     Problem: Diarrhea:  Goal: Bowel elimination is within specified parameters  Description: Bowel elimination is within specified parameters  Outcome: Not Met This Shift  Goal: Passage of soft, formed stool  Description: Passage of soft, formed stool  Outcome: Not Met This Shift  Goal: Establishment of normal bowel function will improve to within specified parameters  Description: Establishment of normal bowel function will improve to within specified parameters  Outcome: Not Met This Shift     Problem: Restraint Use - Nonviolent/Non-Self-Destructive Behavior:  Goal: Absence of restraint indications  Description: Absence of restraint indications  Outcome: Not Met This Shift SYNTHROID 175 mcg tablet TAKE 1 TABLET DAILY 90 tablet 1     No current facility-administered medications for this visit.      Past Medical History:   Diagnosis Date   • Acne    • Hypothyroidism    • Seasonal allergies      Family History   Problem Relation Age of Onset   • Diabetes Biological Mother    • Kidney disease Biological Mother    • Heart disease Biological Father    • Breast cancer Biological Sister    • Deep vein thrombosis Biological Sister      Past Surgical History:   Procedure Laterality Date   • LASER ABLATION OF THE CERVIX       Social History     Socioeconomic History   • Marital status:      Spouse name: Not on file   • Number of children: Not on file   • Years of education: Not on file   • Highest education level: Not on file   Occupational History   • Not on file   Social Needs   • Financial resource strain: Not on file   • Food insecurity:     Worry: Not on file     Inability: Not on file   • Transportation needs:     Medical: Not on file     Non-medical: Not on file   Tobacco Use   • Smoking status: Never Smoker   • Smokeless tobacco: Never Used   Substance and Sexual Activity   • Alcohol use: Yes     Comment: 2-3 weely    • Drug use: Never   • Sexual activity: Not on file   Lifestyle   • Physical activity:     Days per week: Not on file     Minutes per session: Not on file   • Stress: Not on file   Relationships   • Social connections:     Talks on phone: Not on file     Gets together: Not on file     Attends Voodoo service: Not on file     Active member of club or organization: Not on file     Attends meetings of clubs or organizations: Not on file     Relationship status: Not on file   • Intimate partner violence:     Fear of current or ex partner: Not on file     Emotionally abused: Not on file     Physically abused: Not on file     Forced sexual activity: Not on file   Other Topics Concern   • Not on file   Social History Narrative   • Not on file     Allergies   Allergen  "Reactions   • Hydromorphone      Other reaction(s): eye tearing   • Sulfamethoxazole Hives   • Trimethoprim Hives       Objective   Visit Vitals  /80 (BP Location: Right upper arm, Patient Position: Sitting)   Pulse 75   Temp 36.4 °C (97.6 °F) (Oral)   Resp 16   Ht 1.6 m (5' 3\")   Wt 76.8 kg (169 lb 6.4 oz)   SpO2 99%   BMI 30.01 kg/m²     Physical Exam   Constitutional: She is oriented to person, place, and time. She appears well-developed and well-nourished.   HENT:   Head: Normocephalic and atraumatic.   Right Ear: External ear normal.   Left Ear: External ear normal.   Eyes: Pupils are equal, round, and reactive to light. Conjunctivae are normal.   Neck: Normal range of motion. Neck supple. No thyromegaly present.   Cardiovascular: Normal rate, regular rhythm and normal heart sounds.   No murmur heard.  Pulmonary/Chest: Effort normal and breath sounds normal. She has no wheezes.   Abdominal: Soft. Bowel sounds are normal. There is no tenderness.   Musculoskeletal: Normal range of motion. She exhibits no tenderness.   Lymphadenopathy:     She has no cervical adenopathy.   Neurological: She is alert and oriented to person, place, and time. No cranial nerve deficit.   Skin: Skin is warm and dry. No rash noted.   Psychiatric: She has a normal mood and affect.   Nursing note and vitals reviewed.      Assessment/Plan   Problem List Items Addressed This Visit        Endocrine/Metabolic    Hypothyroidism    Relevant Orders    TSH 3rd Generation    T4, free       Other    Other acne      Other Visit Diagnoses     Routine physical examination    -  Primary    Relevant Orders    Comprehensive metabolic panel    CBC    Lipid panel      She is doing well overall. Discussed diet and exercise- cont weight loss. UTD with gyn care. Due for mammo soon. UTD with colonoscopy. Will check fasting labs. UTD with eye and dental exam. UTD with immunizations and due for Tdap next yr. Will hold on dexa at this time and perhaps get " next yr or so. Will cont current meds at this time.     Eloisa Domingo, DO  1/13/2020

## 2021-01-18 ENCOUNTER — OFFICE VISIT (OUTPATIENT)
Dept: PRIMARY CARE | Facility: CLINIC | Age: 56
End: 2021-01-18
Payer: COMMERCIAL

## 2021-01-18 VITALS
WEIGHT: 165.6 LBS | SYSTOLIC BLOOD PRESSURE: 138 MMHG | TEMPERATURE: 98 F | DIASTOLIC BLOOD PRESSURE: 82 MMHG | HEART RATE: 79 BPM | OXYGEN SATURATION: 99 % | BODY MASS INDEX: 25.99 KG/M2 | RESPIRATION RATE: 14 BRPM | HEIGHT: 67 IN

## 2021-01-18 DIAGNOSIS — E06.3 HYPOTHYROIDISM DUE TO HASHIMOTO'S THYROIDITIS: ICD-10-CM

## 2021-01-18 DIAGNOSIS — L70.8 OTHER ACNE: ICD-10-CM

## 2021-01-18 DIAGNOSIS — R92.8 ABNORMAL MAMMOGRAM: ICD-10-CM

## 2021-01-18 DIAGNOSIS — Z23 NEED FOR SHINGLES VACCINE: ICD-10-CM

## 2021-01-18 DIAGNOSIS — Z00.00 ROUTINE PHYSICAL EXAMINATION: Primary | ICD-10-CM

## 2021-01-18 PROCEDURE — 90471 IMMUNIZATION ADMIN: CPT | Performed by: FAMILY MEDICINE

## 2021-01-18 PROCEDURE — 99396 PREV VISIT EST AGE 40-64: CPT | Mod: 25 | Performed by: FAMILY MEDICINE

## 2021-01-18 PROCEDURE — 90750 HZV VACC RECOMBINANT IM: CPT | Performed by: FAMILY MEDICINE

## 2021-01-18 RX ORDER — CETIRIZINE HYDROCHLORIDE 10 MG/1
10 TABLET ORAL DAILY
COMMUNITY
End: 2024-06-28 | Stop reason: ALTCHOICE

## 2021-01-18 SDOH — HEALTH STABILITY: MENTAL HEALTH: HOW OFTEN DO YOU HAVE A DRINK CONTAINING ALCOHOL?: 2-3 TIMES A WEEK

## 2021-01-18 SDOH — HEALTH STABILITY: MENTAL HEALTH: HOW OFTEN DO YOU HAVE SIX OR MORE DRINKS ON ONE OCCASION?: NEVER

## 2021-01-18 SDOH — HEALTH STABILITY: MENTAL HEALTH: HOW MANY DRINKS CONTAINING ALCOHOL DO YOU HAVE ON A TYPICAL DAY WHEN YOU ARE DRINKING?: 1 OR 2

## 2021-01-18 ASSESSMENT — ENCOUNTER SYMPTOMS
SINUS PRESSURE: 0
MYALGIAS: 0
WEAKNESS: 0
HEMATURIA: 0
SLEEP DISTURBANCE: 0
FATIGUE: 0
PALPITATIONS: 0
HEADACHES: 0
ARTHRALGIAS: 0
WHEEZING: 0
BRUISES/BLEEDS EASILY: 0
COUGH: 0
DIARRHEA: 0
DIZZINESS: 0
EYE PAIN: 0
SHORTNESS OF BREATH: 0
RHINORRHEA: 0
CONSTIPATION: 0
FREQUENCY: 0
SORE THROAT: 0
SINUS PAIN: 0
ABDOMINAL PAIN: 0
CHEST TIGHTNESS: 0
NERVOUS/ANXIOUS: 0
FEVER: 0
CHILLS: 0

## 2021-01-18 ASSESSMENT — PATIENT HEALTH QUESTIONNAIRE - PHQ9: SUM OF ALL RESPONSES TO PHQ9 QUESTIONS 1 & 2: 0

## 2021-01-18 NOTE — PROGRESS NOTES
Subjective      Patient ID: Arelis Chin is a 55 y.o. female.    She is here for physical. Pt has been doing well overall. She is trying to follow well balanced meals. Stays well hydrated. She is active with her 4 children and doing household chores and walking 30min daily. Average 7-8hrs of sleep a night. UTD with gyn care/mammo. Normal BMs and urination. UTD with colonoscopy in 2019 and due in 5yrs. UTD with eye and dental exam. Due for fasting labs. UTD with immunizations and would like shingrex today.       The following have been reviewed and updated as appropriate in this visit:  Tobacco  Allergies  Meds  Problems  Surg Hx  Fam Hx       Review of Systems   Constitutional: Negative for chills, fatigue and fever.   HENT: Negative for ear pain, postnasal drip, rhinorrhea, sinus pressure, sinus pain and sore throat.    Eyes: Negative for pain and visual disturbance.   Respiratory: Negative for cough, chest tightness, shortness of breath and wheezing.    Cardiovascular: Negative for chest pain and palpitations.   Gastrointestinal: Negative for abdominal pain, constipation and diarrhea.   Genitourinary: Negative for decreased urine volume, frequency and hematuria.   Musculoskeletal: Negative for arthralgias and myalgias.   Skin: Negative for rash.   Neurological: Negative for dizziness, weakness and headaches.   Hematological: Does not bruise/bleed easily.   Psychiatric/Behavioral: Negative for behavioral problems, sleep disturbance and suicidal ideas. The patient is not nervous/anxious.        Current Outpatient Medications   Medication Sig Dispense Refill   • cetirizine (ZyrTEC) 10 mg tablet Take 10 mg by mouth daily.     • conj estrog-medroxyprogest ace (PREMPRO) 0.45-1.5 mg per tablet take 1 tablet by oral route  every day     • spironolactone (ALDACTONE) 100 mg tablet TAKE 1 TABLET ONCE DAILY 90 tablet 3   • SYNTHROID 175 mcg tablet TAKE 1 TABLET DAILY 90 tablet 3     No current  facility-administered medications for this visit.      Past Medical History:   Diagnosis Date   • Acne    • Hypothyroidism    • Seasonal allergies      Family History   Problem Relation Age of Onset   • Diabetes Biological Mother    • Kidney disease Biological Mother    • Heart disease Biological Father    • Breast cancer Biological Sister    • Deep vein thrombosis Biological Sister    • No Known Problems Maternal Grandmother    • No Known Problems Maternal Grandfather    • No Known Problems Paternal Grandmother    • No Known Problems Paternal Grandfather      Past Surgical History:   Procedure Laterality Date   • LASER ABLATION OF THE CERVIX       Social History     Socioeconomic History   • Marital status:      Spouse name: Not on file   • Number of children: 4   • Years of education: Not on file   • Highest education level: Not on file   Occupational History   • Not on file   Social Needs   • Financial resource strain: Not on file   • Food insecurity     Worry: Not on file     Inability: Not on file   • Transportation needs     Medical: Not on file     Non-medical: Not on file   Tobacco Use   • Smoking status: Never Smoker   • Smokeless tobacco: Never Used   Substance and Sexual Activity   • Alcohol use: Yes     Frequency: 2-3 times a week     Drinks per session: 1 or 2     Binge frequency: Never     Comment: 2-3 weely    • Drug use: Never   • Sexual activity: Yes     Partners: Male     Birth control/protection: None   Lifestyle   • Physical activity     Days per week: Not on file     Minutes per session: Not on file   • Stress: Not on file   Relationships   • Social connections     Talks on phone: Not on file     Gets together: Not on file     Attends Sikh service: Not on file     Active member of club or organization: Not on file     Attends meetings of clubs or organizations: Not on file     Relationship status: Not on file   • Intimate partner violence     Fear of current or ex partner: Not on  "file     Emotionally abused: Not on file     Physically abused: Not on file     Forced sexual activity: Not on file   Other Topics Concern   • Not on file   Social History Narrative   • Not on file     Allergies   Allergen Reactions   • Hydromorphone      Other reaction(s): eye tearing   • Sulfamethoxazole Hives   • Trimethoprim Hives       Objective   Visit Vitals  /82 (BP Location: Left upper arm, Patient Position: Sitting)   Pulse 79   Temp 36.7 °C (98 °F) (Oral)   Resp 14   Ht 1.689 m (5' 6.5\")   Wt 75.1 kg (165 lb 9.6 oz)   SpO2 99%   BMI 26.33 kg/m²     Physical Exam  Vitals signs and nursing note reviewed.   Constitutional:       Appearance: Normal appearance. She is well-developed.   HENT:      Head: Normocephalic and atraumatic.      Right Ear: Tympanic membrane and external ear normal.      Left Ear: Tympanic membrane and external ear normal.      Nose: Nose normal.      Mouth/Throat:      Mouth: Mucous membranes are moist.      Pharynx: Oropharynx is clear. No posterior oropharyngeal erythema.   Eyes:      Conjunctiva/sclera: Conjunctivae normal.      Pupils: Pupils are equal, round, and reactive to light.   Neck:      Musculoskeletal: Normal range of motion and neck supple.      Thyroid: No thyromegaly.   Cardiovascular:      Rate and Rhythm: Normal rate and regular rhythm.      Heart sounds: Normal heart sounds. No murmur.   Pulmonary:      Effort: Pulmonary effort is normal.      Breath sounds: Normal breath sounds. No wheezing.   Abdominal:      General: Bowel sounds are normal.      Palpations: Abdomen is soft.      Tenderness: There is no abdominal tenderness.   Musculoskeletal: Normal range of motion.         General: No tenderness.   Lymphadenopathy:      Cervical: No cervical adenopathy.   Skin:     General: Skin is warm and dry.      Findings: No rash.   Neurological:      General: No focal deficit present.      Mental Status: She is alert and oriented to person, place, and time.      " Cranial Nerves: No cranial nerve deficit.   Psychiatric:         Mood and Affect: Mood normal.         Behavior: Behavior normal.         Assessment/Plan   Problem List Items Addressed This Visit        Endocrine/Metabolic    Hypothyroidism    Relevant Orders    TSH    T4, free       Other    Other acne      Other Visit Diagnoses     Routine physical examination    -  Primary    Relevant Orders    Comprehensive metabolic panel    Lipid panel    CBC    Abnormal mammogram        Relevant Orders    BI DIAGNOSTIC MAMMOGRAM LEFT    Need for shingles vaccine        Relevant Orders    Shingrix (Completed)      She is doing ok overall. Discussed diet and exercise. Will check updated fasting labs. UTD with eye and dental exam. UTD with mammo and will be going back for L breast in 6 months to monitor. UTD with immunizations. UTD with colonoscopy. Roney be setting annual gyn soon. Will cont current meds at this time. Her bp alittle on the higher end today so she is going to keep bp log for me and provide me numbers to review in 4wks and call with any questions or concerns.     Eloisa Domingo, DO  1/18/2021

## 2021-02-07 DIAGNOSIS — L70.9 ACNE, UNSPECIFIED ACNE TYPE: ICD-10-CM

## 2021-02-07 DIAGNOSIS — E03.9 HYPOTHYROIDISM, UNSPECIFIED TYPE: ICD-10-CM

## 2021-02-08 RX ORDER — SPIRONOLACTONE 100 MG/1
TABLET, FILM COATED ORAL
Qty: 90 TABLET | Refills: 0 | Status: SHIPPED | OUTPATIENT
Start: 2021-02-08 | End: 2021-05-10 | Stop reason: SDUPTHER

## 2021-02-08 RX ORDER — LEVOTHYROXINE SODIUM 175 UG/1
TABLET ORAL
Qty: 90 TABLET | Refills: 0 | Status: SHIPPED | OUTPATIENT
Start: 2021-02-08 | End: 2021-05-10 | Stop reason: SDUPTHER

## 2021-02-18 LAB
ALBUMIN SERPL-MCNC: 4.2 G/DL (ref 3.6–5.1)
ALBUMIN/GLOB SERPL: 1.7 (CALC) (ref 1–2.5)
ALP SERPL-CCNC: 84 U/L (ref 37–153)
ALT SERPL-CCNC: 13 U/L (ref 6–29)
AST SERPL-CCNC: 14 U/L (ref 10–35)
BILIRUB SERPL-MCNC: 0.6 MG/DL (ref 0.2–1.2)
BUN SERPL-MCNC: 17 MG/DL (ref 7–25)
BUN/CREAT SERPL: NORMAL (CALC) (ref 6–22)
CALCIUM SERPL-MCNC: 9.3 MG/DL (ref 8.6–10.4)
CHLORIDE SERPL-SCNC: 106 MMOL/L (ref 98–110)
CHOLEST SERPL-MCNC: 216 MG/DL
CHOLEST/HDLC SERPL: 3.7 (CALC)
CO2 SERPL-SCNC: 25 MMOL/L (ref 20–32)
CREAT SERPL-MCNC: 0.89 MG/DL (ref 0.5–1.05)
ERYTHROCYTE [DISTWIDTH] IN BLOOD BY AUTOMATED COUNT: 12.3 % (ref 11–15)
GLOBULIN SER CALC-MCNC: 2.5 G/DL (CALC) (ref 1.9–3.7)
GLUCOSE SERPL-MCNC: 92 MG/DL (ref 65–99)
HCT VFR BLD AUTO: 42.9 % (ref 35–45)
HDLC SERPL-MCNC: 59 MG/DL
HGB BLD-MCNC: 14.8 G/DL (ref 11.7–15.5)
LDLC SERPL CALC-MCNC: 137 MG/DL (CALC)
MCH RBC QN AUTO: 31.8 PG (ref 27–33)
MCHC RBC AUTO-ENTMCNC: 34.5 G/DL (ref 32–36)
MCV RBC AUTO: 92.1 FL (ref 80–100)
NONHDLC SERPL-MCNC: 157 MG/DL (CALC)
PLATELET # BLD AUTO: 211 THOUSAND/UL (ref 140–400)
PMV BLD REES-ECKER: 10.8 FL (ref 7.5–12.5)
POTASSIUM SERPL-SCNC: 4.1 MMOL/L (ref 3.5–5.3)
PROT SERPL-MCNC: 6.7 G/DL (ref 6.1–8.1)
QUEST EGFR NON-AFR. AMERICAN: 72 ML/MIN/1.73M2
RBC # BLD AUTO: 4.66 MILLION/UL (ref 3.8–5.1)
SODIUM SERPL-SCNC: 139 MMOL/L (ref 135–146)
T4 FREE SERPL-MCNC: 1.4 NG/DL (ref 0.8–1.8)
TRIGL SERPL-MCNC: 102 MG/DL
TSH SERPL-ACNC: 1 MIU/L (ref 0.4–4.5)
WBC # BLD AUTO: 6 THOUSAND/UL (ref 3.8–10.8)

## 2021-03-18 ENCOUNTER — CLINICAL SUPPORT (OUTPATIENT)
Dept: PRIMARY CARE | Facility: CLINIC | Age: 56
End: 2021-03-18
Payer: COMMERCIAL

## 2021-03-18 DIAGNOSIS — Z23 NEED FOR VACCINATION: Primary | ICD-10-CM

## 2021-03-18 PROCEDURE — 90471 IMMUNIZATION ADMIN: CPT | Performed by: FAMILY MEDICINE

## 2021-03-18 PROCEDURE — 200200 PR NO CHARGE: Performed by: FAMILY MEDICINE

## 2021-03-18 PROCEDURE — 90750 HZV VACC RECOMBINANT IM: CPT | Performed by: FAMILY MEDICINE

## 2021-04-15 DIAGNOSIS — Z23 ENCOUNTER FOR IMMUNIZATION: ICD-10-CM

## 2021-04-20 ENCOUNTER — IMMUNIZATIONS (OUTPATIENT)
Dept: FAMILY MEDICINE CLINIC | Facility: HOSPITAL | Age: 56
End: 2021-04-20

## 2021-04-20 DIAGNOSIS — Z23 ENCOUNTER FOR IMMUNIZATION: Primary | ICD-10-CM

## 2021-04-20 PROCEDURE — 0001A SARS-COV-2 / COVID-19 MRNA VACCINE (PFIZER-BIONTECH) 30 MCG: CPT

## 2021-04-20 PROCEDURE — 91300 SARS-COV-2 / COVID-19 MRNA VACCINE (PFIZER-BIONTECH) 30 MCG: CPT

## 2021-05-10 DIAGNOSIS — E03.9 HYPOTHYROIDISM, UNSPECIFIED TYPE: ICD-10-CM

## 2021-05-10 DIAGNOSIS — L70.9 ACNE, UNSPECIFIED ACNE TYPE: ICD-10-CM

## 2021-05-10 RX ORDER — SPIRONOLACTONE 100 MG/1
100 TABLET, FILM COATED ORAL
Qty: 90 TABLET | Refills: 1 | Status: SHIPPED | OUTPATIENT
Start: 2021-05-10 | End: 2021-10-28 | Stop reason: SDUPTHER

## 2021-05-10 RX ORDER — LEVOTHYROXINE SODIUM 175 UG/1
175 TABLET ORAL
Qty: 90 TABLET | Refills: 1 | Status: SHIPPED | OUTPATIENT
Start: 2021-05-10 | End: 2021-10-28 | Stop reason: SDUPTHER

## 2021-05-15 ENCOUNTER — IMMUNIZATIONS (OUTPATIENT)
Dept: FAMILY MEDICINE CLINIC | Facility: HOSPITAL | Age: 56
End: 2021-05-15

## 2021-05-15 DIAGNOSIS — Z23 ENCOUNTER FOR IMMUNIZATION: Primary | ICD-10-CM

## 2021-05-15 PROCEDURE — 0002A SARS-COV-2 / COVID-19 MRNA VACCINE (PFIZER-BIONTECH) 30 MCG: CPT

## 2021-05-15 PROCEDURE — 91300 SARS-COV-2 / COVID-19 MRNA VACCINE (PFIZER-BIONTECH) 30 MCG: CPT

## 2021-10-28 DIAGNOSIS — L70.9 ACNE, UNSPECIFIED ACNE TYPE: ICD-10-CM

## 2021-10-28 DIAGNOSIS — E03.9 HYPOTHYROIDISM, UNSPECIFIED TYPE: ICD-10-CM

## 2021-10-28 RX ORDER — LEVOTHYROXINE SODIUM 175 UG/1
175 TABLET ORAL
Qty: 90 TABLET | Refills: 1 | Status: SHIPPED | OUTPATIENT
Start: 2021-10-28 | End: 2022-03-11 | Stop reason: SDUPTHER

## 2021-10-28 RX ORDER — SPIRONOLACTONE 100 MG/1
100 TABLET, FILM COATED ORAL
Qty: 90 TABLET | Refills: 1 | Status: SHIPPED | OUTPATIENT
Start: 2021-10-28 | End: 2022-03-11 | Stop reason: SDUPTHER

## 2022-03-11 DIAGNOSIS — Z00.00 ROUTINE PHYSICAL EXAMINATION: Primary | ICD-10-CM

## 2022-03-11 DIAGNOSIS — E06.3 HYPOTHYROIDISM DUE TO HASHIMOTO'S THYROIDITIS: ICD-10-CM

## 2022-04-08 LAB
ALBUMIN SERPL-MCNC: 4.1 G/DL (ref 3.6–5.1)
ALBUMIN/GLOB SERPL: 1.6 (CALC) (ref 1–2.5)
ALP SERPL-CCNC: 83 U/L (ref 37–153)
ALT SERPL-CCNC: 14 U/L (ref 6–29)
AST SERPL-CCNC: 16 U/L (ref 10–35)
BILIRUB SERPL-MCNC: 0.6 MG/DL (ref 0.2–1.2)
BUN SERPL-MCNC: 16 MG/DL (ref 7–25)
BUN/CREAT SERPL: NORMAL (CALC) (ref 6–22)
CALCIUM SERPL-MCNC: 9.4 MG/DL (ref 8.6–10.4)
CHLORIDE SERPL-SCNC: 105 MMOL/L (ref 98–110)
CHOLEST SERPL-MCNC: 205 MG/DL
CHOLEST/HDLC SERPL: 3.4 (CALC)
CO2 SERPL-SCNC: 29 MMOL/L (ref 20–32)
CREAT SERPL-MCNC: 0.81 MG/DL (ref 0.5–1.05)
ERYTHROCYTE [DISTWIDTH] IN BLOOD BY AUTOMATED COUNT: 12.2 % (ref 11–15)
GLOBULIN SER CALC-MCNC: 2.6 G/DL (CALC) (ref 1.9–3.7)
GLUCOSE SERPL-MCNC: 92 MG/DL (ref 65–99)
HCT VFR BLD AUTO: 43.1 % (ref 35–45)
HDLC SERPL-MCNC: 60 MG/DL
HGB BLD-MCNC: 14.6 G/DL (ref 11.7–15.5)
LDLC SERPL CALC-MCNC: 123 MG/DL (CALC)
MCH RBC QN AUTO: 30.7 PG (ref 27–33)
MCHC RBC AUTO-ENTMCNC: 33.9 G/DL (ref 32–36)
MCV RBC AUTO: 90.7 FL (ref 80–100)
NONHDLC SERPL-MCNC: 145 MG/DL (CALC)
PLATELET # BLD AUTO: 211 THOUSAND/UL (ref 140–400)
PMV BLD REES-ECKER: 10.9 FL (ref 7.5–12.5)
POTASSIUM SERPL-SCNC: 4.1 MMOL/L (ref 3.5–5.3)
PROT SERPL-MCNC: 6.7 G/DL (ref 6.1–8.1)
QUEST EGFR AFRICAN AMERICAN: 93 ML/MIN/1.73M2
QUEST EGFR NON-AFR. AMERICAN: 81 ML/MIN/1.73M2
RBC # BLD AUTO: 4.75 MILLION/UL (ref 3.8–5.1)
SODIUM SERPL-SCNC: 139 MMOL/L (ref 135–146)
T4 FREE SERPL-MCNC: 1.7 NG/DL (ref 0.8–1.8)
TRIGL SERPL-MCNC: 109 MG/DL
TSH SERPL-ACNC: 0.32 MIU/L (ref 0.4–4.5)
WBC # BLD AUTO: 5.6 THOUSAND/UL (ref 3.8–10.8)

## 2022-04-22 ENCOUNTER — OFFICE VISIT (OUTPATIENT)
Dept: PRIMARY CARE | Facility: CLINIC | Age: 57
End: 2022-04-22
Payer: COMMERCIAL

## 2022-04-22 VITALS
BODY MASS INDEX: 30.12 KG/M2 | SYSTOLIC BLOOD PRESSURE: 122 MMHG | HEIGHT: 63 IN | HEART RATE: 75 BPM | RESPIRATION RATE: 16 BRPM | DIASTOLIC BLOOD PRESSURE: 82 MMHG | TEMPERATURE: 98.5 F | OXYGEN SATURATION: 98 % | WEIGHT: 170 LBS

## 2022-04-22 DIAGNOSIS — E06.3 HYPOTHYROIDISM DUE TO HASHIMOTO'S THYROIDITIS: ICD-10-CM

## 2022-04-22 DIAGNOSIS — L70.8 OTHER ACNE: ICD-10-CM

## 2022-04-22 DIAGNOSIS — Z23 NEED FOR TDAP VACCINATION: ICD-10-CM

## 2022-04-22 DIAGNOSIS — Z00.00 ROUTINE PHYSICAL EXAMINATION: Primary | ICD-10-CM

## 2022-04-22 PROCEDURE — 99396 PREV VISIT EST AGE 40-64: CPT | Mod: 25 | Performed by: FAMILY MEDICINE

## 2022-04-22 PROCEDURE — 90471 IMMUNIZATION ADMIN: CPT | Performed by: FAMILY MEDICINE

## 2022-04-22 PROCEDURE — 90715 TDAP VACCINE 7 YRS/> IM: CPT | Performed by: FAMILY MEDICINE

## 2022-04-22 PROCEDURE — 3008F BODY MASS INDEX DOCD: CPT | Performed by: FAMILY MEDICINE

## 2022-04-22 ASSESSMENT — PATIENT HEALTH QUESTIONNAIRE - PHQ9: SUM OF ALL RESPONSES TO PHQ9 QUESTIONS 1 & 2: 0

## 2022-04-22 ASSESSMENT — ENCOUNTER SYMPTOMS
SORE THROAT: 0
HEADACHES: 0
BLOOD IN STOOL: 0
MYALGIAS: 0
NERVOUS/ANXIOUS: 0
RHINORRHEA: 0
ARTHRALGIAS: 0
CONSTIPATION: 0
FREQUENCY: 0
DYSURIA: 0
COUGH: 0
DIZZINESS: 0
FATIGUE: 0
CHILLS: 0
SHORTNESS OF BREATH: 0
HEMATURIA: 0
WHEEZING: 0
PALPITATIONS: 0
FEVER: 0
SLEEP DISTURBANCE: 0
ABDOMINAL PAIN: 0

## 2022-04-22 NOTE — PROGRESS NOTES
Subjective      Patient ID: Arelis Chin is a 56 y.o. female.    She is here for physical. She is doing well overall. She is trying to follow well balanced meals. Stays well hydrated. 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 Average 7hrs of sleep a night. Normal BMs and urination. UTD with colonoscopy in 2019 and due in 5 yrs. Postmenopausal. Due for mammo and planning to schedule soon. UTD with gyn care which was done in Oct. UTD with eye and dental exam. UTD with immunizations. UTD with fasting labs which did show the cholesterol getting better.        The following have been reviewed and updated as appropriate in this visit:   Tobacco  Allergies  Meds  Problems  Med Hx  Surg Hx  Fam Hx         Review of Systems   Constitutional: Negative for chills, fatigue and fever.   HENT: Negative for ear discharge, ear pain, postnasal drip, rhinorrhea and sore throat.    Respiratory: Negative for cough, shortness of breath and wheezing.    Cardiovascular: Negative for chest pain and palpitations.   Gastrointestinal: Negative for abdominal pain, blood in stool and constipation.   Genitourinary: Negative for dysuria, frequency and hematuria.   Musculoskeletal: Negative for arthralgias and myalgias.   Skin: Negative for rash.   Neurological: Negative for dizziness and headaches.   Psychiatric/Behavioral: Negative for sleep disturbance and suicidal ideas. The patient is not nervous/anxious.        Current Outpatient Medications   Medication Sig Dispense Refill   • cetirizine (ZyrTEC) 10 mg tablet Take 10 mg by mouth daily.     • conj estrog-medroxyprogest ace (PREMPRO) 0.45-1.5 mg per tablet take 1 tablet by oral route  every day     • levothyroxine (SYNTHROID) 175 mcg tablet Take 1 tablet (175 mcg total) by mouth once daily. 90 tablet 0   • spironolactone (ALDACTONE) 100 mg tablet Take 1 tablet (100 mg total) by mouth once daily. 90  tablet 0     No current facility-administered medications for this visit.     Past Medical History:   Diagnosis Date   • Acne    • Hypothyroidism    • Seasonal allergies      Family History   Problem Relation Age of Onset   • Diabetes Biological Mother    • Kidney disease Biological Mother    • Diabetes Biological Father    • Heart disease Biological Father    • Breast cancer Biological Sister    • Deep vein thrombosis Biological Sister    • No Known Problems Maternal Grandmother    • No Known Problems Maternal Grandfather    • No Known Problems Paternal Grandmother    • No Known Problems Paternal Grandfather      Past Surgical History:   Procedure Laterality Date   • LASER ABLATION OF THE CERVIX       Social History     Socioeconomic History   • Marital status:      Spouse name: Not on file   • Number of children: 4   • Years of education: Not on file   • Highest education level: Not on file   Occupational History   • Occupation: Pharmacy Tech   Tobacco Use   • Smoking status: Never Smoker   • Smokeless tobacco: Never Used   Vaping Use   • Vaping Use: Never used   Substance and Sexual Activity   • Alcohol use: Yes     Comment: 2-3 weely    • Drug use: Never   • Sexual activity: Yes     Partners: Male     Birth control/protection: None   Other Topics Concern   • Not on file   Social History Narrative   • Not on file     Social Determinants of Health     Financial Resource Strain: Not on file   Food Insecurity: Not on file   Transportation Needs: Not on file   Physical Activity: Not on file   Stress: Not on file   Social Connections: Not on file   Intimate Partner Violence: Not on file   Housing Stability: Not on file     Allergies   Allergen Reactions   • Hydromorphone      Other reaction(s): eye tearing   • Sulfamethoxazole Hives   • Trimethoprim Hives       Objective   Visit Vitals  /82 (BP Location: Left upper arm, Patient Position: Sitting)   Pulse 75   Temp 36.9 °C (98.5 °F) (Oral)   Resp 16   Ht  "1.6 m (5' 3\")   Wt 77.1 kg (170 lb)   SpO2 98%   BMI 30.11 kg/m²     Physical Exam  Vitals and nursing note reviewed.   Constitutional:       Appearance: Normal appearance.   HENT:      Right Ear: Tympanic membrane normal. There is no impacted cerumen.      Left Ear: Tympanic membrane normal. There is no impacted cerumen.      Nose: Nose normal. No rhinorrhea.      Mouth/Throat:      Mouth: Mucous membranes are moist.      Pharynx: Oropharynx is clear. No posterior oropharyngeal erythema.   Cardiovascular:      Rate and Rhythm: Normal rate and regular rhythm.      Heart sounds: No murmur heard.  Pulmonary:      Effort: Pulmonary effort is normal.      Breath sounds: Normal breath sounds. No wheezing.   Abdominal:      General: Bowel sounds are normal.      Palpations: Abdomen is soft.      Tenderness: There is no abdominal tenderness.   Musculoskeletal:         General: Normal range of motion.      Cervical back: Normal range of motion and neck supple.   Skin:     General: Skin is warm.      Findings: No rash.   Neurological:      General: No focal deficit present.      Mental Status: She is alert and oriented to person, place, and time.      Cranial Nerves: No cranial nerve deficit.   Psychiatric:         Mood and Affect: Mood normal.         Behavior: Behavior normal.         Assessment/Plan   Problem List Items Addressed This Visit        Endocrine/Metabolic    Hypothyroidism    Relevant Orders    TSH    T4, free       Other    Other acne      Other Visit Diagnoses     Routine physical examination    -  Primary    Need for Tdap vaccination        Relevant Orders    Tdap vaccine greater than or equal to 6yo IM (Completed)      She is doing well overall. Discussed diet and exercise. We reviewed labs today and cholesterol looking better and we discussed calcium score but for now we will see how things go and if she wants to do this she will let me know. UTD with eye and dental exam. UTD with colonoscopy. UTD with " gyn care and will do mammo soon. UTD with immunizations. Thyroid level alittle low but she is feeling well so will cont current dose and recheck labs in 6 months.     Eloisa Domingo, DO  4/22/2022

## 2022-10-08 DIAGNOSIS — E03.9 HYPOTHYROIDISM, UNSPECIFIED TYPE: ICD-10-CM

## 2022-10-08 DIAGNOSIS — L70.9 ACNE, UNSPECIFIED ACNE TYPE: ICD-10-CM

## 2022-10-10 RX ORDER — LEVOTHYROXINE SODIUM 175 UG/1
TABLET ORAL
Qty: 90 TABLET | Refills: 1 | Status: SHIPPED | OUTPATIENT
Start: 2022-10-10 | End: 2023-02-23

## 2022-10-10 RX ORDER — SPIRONOLACTONE 100 MG/1
TABLET, FILM COATED ORAL
Qty: 90 TABLET | Refills: 1 | Status: SHIPPED | OUTPATIENT
Start: 2022-10-10 | End: 2023-02-23

## 2022-10-19 ENCOUNTER — TRANSCRIBE ORDERS (OUTPATIENT)
Dept: SCHEDULING | Age: 57
End: 2022-10-19

## 2022-10-19 DIAGNOSIS — Z12.31 ENCOUNTER FOR SCREENING MAMMOGRAM FOR MALIGNANT NEOPLASM OF BREAST: Primary | ICD-10-CM

## 2022-10-19 DIAGNOSIS — R92.8 OTHER ABNORMAL AND INCONCLUSIVE FINDINGS ON DIAGNOSTIC IMAGING OF BREAST: ICD-10-CM

## 2022-11-21 ENCOUNTER — HOSPITAL ENCOUNTER (OUTPATIENT)
Dept: RADIOLOGY | Facility: CLINIC | Age: 57
Discharge: HOME | End: 2022-11-21
Attending: OBSTETRICS & GYNECOLOGY
Payer: COMMERCIAL

## 2022-11-21 ENCOUNTER — TRANSCRIBE ORDERS (OUTPATIENT)
Dept: REGISTRATION | Facility: CLINIC | Age: 57
End: 2022-11-21

## 2022-11-21 DIAGNOSIS — Z12.31 ENCOUNTER FOR SCREENING MAMMOGRAM FOR MALIGNANT NEOPLASM OF BREAST: ICD-10-CM

## 2022-11-21 DIAGNOSIS — Z12.31 ENCOUNTER FOR SCREENING MAMMOGRAM FOR MALIGNANT NEOPLASM OF BREAST: Primary | ICD-10-CM

## 2022-11-21 DIAGNOSIS — R92.8 OTHER ABNORMAL AND INCONCLUSIVE FINDINGS ON DIAGNOSTIC IMAGING OF BREAST: ICD-10-CM

## 2022-11-21 PROCEDURE — 77067 SCR MAMMO BI INCL CAD: CPT

## 2022-12-15 LAB
T4 FREE SERPL-MCNC: 1.5 NG/DL (ref 0.8–1.8)
TSH SERPL-ACNC: 0.94 MIU/L (ref 0.4–4.5)

## 2023-02-22 DIAGNOSIS — L70.9 ACNE, UNSPECIFIED ACNE TYPE: ICD-10-CM

## 2023-02-22 DIAGNOSIS — E03.9 HYPOTHYROIDISM, UNSPECIFIED TYPE: ICD-10-CM

## 2023-02-23 RX ORDER — LEVOTHYROXINE SODIUM 175 UG/1
TABLET ORAL
Qty: 90 TABLET | Refills: 1 | Status: SHIPPED | OUTPATIENT
Start: 2023-02-23 | End: 2023-12-06 | Stop reason: SDUPTHER

## 2023-02-23 RX ORDER — SPIRONOLACTONE 100 MG/1
TABLET, FILM COATED ORAL
Qty: 90 TABLET | Refills: 1 | Status: SHIPPED | OUTPATIENT
Start: 2023-02-23 | End: 2023-12-06 | Stop reason: SDUPTHER

## 2023-04-24 ENCOUNTER — OFFICE VISIT (OUTPATIENT)
Dept: PRIMARY CARE | Facility: CLINIC | Age: 58
End: 2023-04-24
Payer: COMMERCIAL

## 2023-04-24 VITALS
HEART RATE: 77 BPM | OXYGEN SATURATION: 97 % | WEIGHT: 182 LBS | HEIGHT: 63 IN | SYSTOLIC BLOOD PRESSURE: 128 MMHG | TEMPERATURE: 97.5 F | BODY MASS INDEX: 32.25 KG/M2 | DIASTOLIC BLOOD PRESSURE: 78 MMHG | RESPIRATION RATE: 16 BRPM

## 2023-04-24 DIAGNOSIS — R53.83 OTHER FATIGUE: ICD-10-CM

## 2023-04-24 DIAGNOSIS — Z00.00 ROUTINE PHYSICAL EXAMINATION: Primary | ICD-10-CM

## 2023-04-24 DIAGNOSIS — L70.8 OTHER ACNE: ICD-10-CM

## 2023-04-24 DIAGNOSIS — E06.3 HYPOTHYROIDISM DUE TO HASHIMOTO'S THYROIDITIS: ICD-10-CM

## 2023-04-24 PROCEDURE — 3008F BODY MASS INDEX DOCD: CPT | Performed by: FAMILY MEDICINE

## 2023-04-24 PROCEDURE — 99396 PREV VISIT EST AGE 40-64: CPT | Performed by: FAMILY MEDICINE

## 2023-04-24 RX ORDER — CHOLECALCIFEROL (VITAMIN D3) 25 MCG
2000 TABLET ORAL DAILY
COMMUNITY

## 2023-04-24 ASSESSMENT — ENCOUNTER SYMPTOMS
SHORTNESS OF BREATH: 0
DIZZINESS: 0
NERVOUS/ANXIOUS: 0
RHINORRHEA: 0
MYALGIAS: 1
ABDOMINAL PAIN: 0
SLEEP DISTURBANCE: 0
FEVER: 0
FATIGUE: 1
CONSTIPATION: 0
WHEEZING: 0
ARTHRALGIAS: 1
COUGH: 0
DYSURIA: 0
FREQUENCY: 0
PALPITATIONS: 0
HEMATURIA: 0
BLOOD IN STOOL: 0
HEADACHES: 0
CHILLS: 0
SORE THROAT: 0

## 2023-04-24 ASSESSMENT — PATIENT HEALTH QUESTIONNAIRE - PHQ9: SUM OF ALL RESPONSES TO PHQ9 QUESTIONS 1 & 2: 0

## 2023-04-24 NOTE — PROGRESS NOTES
Subjective      Patient ID: Arelis Chin is a 57 y.o. female.    She is here for physical. She is doing ok overall. She is trying to follow well balanced meals. Stays well hydrated. She is trying to be more active with walking in the better weather now. 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 Average 7hrs of sleep a night but does get up early and sometimes has hard time staying asleep. +fatigue. She does not snore. UTD with mammo. UTD with colonoscopy in 2019 and due in 5yrs. UTD with eye and dental exam. UTD with immunizations. Due for fasting labs. UTD with gyn care. Does normally have bowel movements daily but past few days alittle GI discomfort but getting better. Aslo has noticed R side of neck is cross.       The following have been reviewed and updated as appropriate in this visit:   Tobacco  Allergies  Meds  Problems  Med Hx  Surg Hx  Fam Hx       Review of Systems   Constitutional: Positive for fatigue. Negative for chills and fever.   HENT: Negative for ear discharge, ear pain, postnasal drip, rhinorrhea and sore throat.    Respiratory: Negative for cough, shortness of breath and wheezing.    Cardiovascular: Negative for chest pain and palpitations.   Gastrointestinal: Negative for abdominal pain, blood in stool and constipation.   Genitourinary: Negative for dysuria, frequency and hematuria.   Musculoskeletal: Positive for arthralgias and myalgias.   Skin: Negative for rash.   Neurological: Negative for dizziness and headaches.   Psychiatric/Behavioral: Negative for sleep disturbance and suicidal ideas. The patient is not nervous/anxious.        Current Outpatient Medications   Medication Sig Dispense Refill   • cetirizine (ZyrTEC) 10 mg tablet Take 10 mg by mouth daily.     • cholecalciferol, vitamin D3, 1,000 unit (25 mcg) tablet Take 2,000 Units by mouth daily.     • conj estrog-medroxyprogest ace (PREMPRO) 0.45-1.5 mg per tablet take 1  tablet by oral route  every day     • spironolactone (ALDACTONE) 100 mg tablet TAKE 1 TABLET ONCE DAILY 90 tablet 1   • SYNTHROID 175 mcg tablet TAKE 1 TABLET ONCE DAILY 90 tablet 1     No current facility-administered medications for this visit.     Past Medical History:   Diagnosis Date   • Acne    • Hypothyroidism    • Seasonal allergies      Family History   Problem Relation Age of Onset   • Thyroid disease Biological Mother    • Diabetes Biological Mother    • Kidney disease Biological Mother    • Diabetes Biological Father    • Heart disease Biological Father    • Breast cancer Biological Sister    • Deep vein thrombosis Biological Sister    • Thyroid disease Biological Sister    • Thyroid disease Biological Brother    • No Known Problems Maternal Grandmother    • No Known Problems Maternal Grandfather    • No Known Problems Paternal Grandmother    • No Known Problems Paternal Grandfather      Past Surgical History:   Procedure Laterality Date   • COLONOSCOPY  2019    every 5yrs   • LASER ABLATION OF THE CERVIX       Social History     Socioeconomic History   • Marital status:      Spouse name: Not on file   • Number of children: 4   • Years of education: Not on file   • Highest education level: Not on file   Occupational History   • Occupation: Medical Coding   Tobacco Use   • Smoking status: Never   • Smokeless tobacco: Never   Vaping Use   • Vaping status: Never Used   Substance and Sexual Activity   • Alcohol use: Yes     Comment: 2-3 weely    • Drug use: Never   • Sexual activity: Yes     Partners: Male     Birth control/protection: None   Other Topics Concern   • Not on file   Social History Narrative   • Not on file     Social Determinants of Health     Financial Resource Strain: Not on file   Food Insecurity: Not on file   Transportation Needs: Not on file   Physical Activity: Not on file   Stress: Not on file   Social Connections: Not on file   Intimate Partner Violence: Not on file   Housing  "Stability: Not on file     Allergies   Allergen Reactions   • Hydromorphone      Other reaction(s): eye tearing   • Sulfamethoxazole Hives   • Trimethoprim Hives       Objective   Visit Vitals  /78 (BP Location: Left upper arm, Patient Position: Sitting)   Pulse 77   Temp 36.4 °C (97.5 °F) (Temporal)   Resp 16   Ht 1.6 m (5' 3\")   Wt 82.6 kg (182 lb) Comment: without shoes   SpO2 97%   BMI 32.24 kg/m²     Physical Exam  Vitals and nursing note reviewed.   Constitutional:       Appearance: Normal appearance.   HENT:      Right Ear: Tympanic membrane normal. There is no impacted cerumen.      Left Ear: Tympanic membrane normal. There is no impacted cerumen.      Nose: Nose normal. No rhinorrhea.      Mouth/Throat:      Mouth: Mucous membranes are moist.      Pharynx: Oropharynx is clear. No posterior oropharyngeal erythema.   Cardiovascular:      Rate and Rhythm: Normal rate and regular rhythm.      Heart sounds: No murmur heard.  Pulmonary:      Effort: Pulmonary effort is normal.      Breath sounds: Normal breath sounds. No wheezing.   Abdominal:      General: Bowel sounds are normal.      Palpations: Abdomen is soft.      Tenderness: There is no abdominal tenderness.   Musculoskeletal:         General: Normal range of motion.      Cervical back: Normal range of motion and neck supple.   Skin:     General: Skin is warm.      Findings: No rash.   Neurological:      General: No focal deficit present.      Mental Status: She is alert and oriented to person, place, and time.      Cranial Nerves: No cranial nerve deficit.   Psychiatric:         Mood and Affect: Mood normal.         Behavior: Behavior normal.         Assessment/Plan   Problem List Items Addressed This Visit        Endocrine/Metabolic    Hypothyroidism    Relevant Orders    ULTRASOUND THYROID    TSH    T4, free    Thyroid peroxidase antibody       Dermatologic    Other acne   Other Visit Diagnoses     Routine physical examination    -  Primary    " Relevant Orders    Comprehensive metabolic panel    Lipid panel    Hemoglobin A1c    Other fatigue        Relevant Orders    Vitamin D 25 hydroxy    Vitamin B12    Iron and TIBC      She is doing well overall. She is trying to follow well balanced meals but hoping with her new job she will be able to get more active and drink more water and hopefully get some more hours of sleep as well. UTD with eye and dental exam. UTD with gyn care/mammo. UTD with colonoscopy. UTD with immunizations. Will see how her labs come back and also will check thyroid US.     Eloisa Domingo, DO  4/24/2023

## 2023-04-28 ENCOUNTER — HOSPITAL ENCOUNTER (OUTPATIENT)
Dept: RADIOLOGY | Facility: CLINIC | Age: 58
Discharge: HOME | End: 2023-04-28
Attending: FAMILY MEDICINE
Payer: COMMERCIAL

## 2023-04-28 DIAGNOSIS — E06.3 HYPOTHYROIDISM DUE TO HASHIMOTO'S THYROIDITIS: ICD-10-CM

## 2023-04-28 PROCEDURE — 76536 US EXAM OF HEAD AND NECK: CPT

## 2023-04-29 LAB
25(OH)D3 SERPL-MCNC: 35 NG/ML (ref 30–100)
ALBUMIN SERPL-MCNC: 4.4 G/DL (ref 3.6–5.1)
ALBUMIN/GLOB SERPL: 1.8 (CALC) (ref 1–2.5)
ALP SERPL-CCNC: 87 U/L (ref 37–153)
ALT SERPL-CCNC: 16 U/L (ref 6–29)
AST SERPL-CCNC: 13 U/L (ref 10–35)
BILIRUB SERPL-MCNC: 0.6 MG/DL (ref 0.2–1.2)
BUN SERPL-MCNC: 17 MG/DL (ref 7–25)
BUN/CREAT SERPL: NORMAL (CALC) (ref 6–22)
CALCIUM SERPL-MCNC: 9.7 MG/DL (ref 8.6–10.4)
CHLORIDE SERPL-SCNC: 104 MMOL/L (ref 98–110)
CHOLEST SERPL-MCNC: 213 MG/DL
CHOLEST/HDLC SERPL: 3.6 (CALC)
CO2 SERPL-SCNC: 27 MMOL/L (ref 20–32)
CREAT SERPL-MCNC: 0.87 MG/DL (ref 0.5–1.03)
EGFRCR SERPLBLD CKD-EPI 2021: 77 ML/MIN/1.73M2
GLOBULIN SER CALC-MCNC: 2.5 G/DL (CALC) (ref 1.9–3.7)
GLUCOSE SERPL-MCNC: 91 MG/DL (ref 65–99)
HBA1C MFR BLD: 4.7 % OF TOTAL HGB
HDLC SERPL-MCNC: 59 MG/DL
IRON SATN MFR SERPL: 44 % (CALC) (ref 16–45)
IRON SERPL-MCNC: 136 MCG/DL (ref 45–160)
LDLC SERPL CALC-MCNC: 132 MG/DL (CALC)
NONHDLC SERPL-MCNC: 154 MG/DL (CALC)
POTASSIUM SERPL-SCNC: 4.1 MMOL/L (ref 3.5–5.3)
PROT SERPL-MCNC: 6.9 G/DL (ref 6.1–8.1)
SODIUM SERPL-SCNC: 139 MMOL/L (ref 135–146)
T4 FREE SERPL-MCNC: 1.4 NG/DL (ref 0.8–1.8)
THYROPEROXIDASE AB SERPL-ACNC: 379 IU/ML
TIBC SERPL-MCNC: 308 MCG/DL (CALC) (ref 250–450)
TRIGL SERPL-MCNC: 116 MG/DL
TSH SERPL-ACNC: 0.84 MIU/L (ref 0.4–4.5)
VIT B12 SERPL-MCNC: 330 PG/ML (ref 200–1100)

## 2023-05-01 DIAGNOSIS — E53.8 VITAMIN B12 DEFICIENCY: Primary | ICD-10-CM

## 2023-05-01 DIAGNOSIS — E55.9 VITAMIN D DEFICIENCY: ICD-10-CM

## 2023-07-30 LAB
25(OH)D3 SERPL-MCNC: 57 NG/ML (ref 30–100)
VIT B12 SERPL-MCNC: 670 PG/ML (ref 200–1100)

## 2023-12-06 DIAGNOSIS — L70.9 ACNE, UNSPECIFIED ACNE TYPE: ICD-10-CM

## 2023-12-06 DIAGNOSIS — E03.9 HYPOTHYROIDISM, UNSPECIFIED TYPE: ICD-10-CM

## 2023-12-07 RX ORDER — LEVOTHYROXINE SODIUM 175 UG/1
175 TABLET ORAL DAILY
Qty: 90 TABLET | Refills: 1 | Status: SHIPPED | OUTPATIENT
Start: 2023-12-07 | End: 2024-02-26 | Stop reason: SDUPTHER

## 2023-12-07 RX ORDER — SPIRONOLACTONE 100 MG/1
100 TABLET, FILM COATED ORAL DAILY
Qty: 90 TABLET | Refills: 1 | Status: SHIPPED | OUTPATIENT
Start: 2023-12-07 | End: 2024-02-26 | Stop reason: SDUPTHER

## 2024-02-16 ENCOUNTER — HOSPITAL ENCOUNTER (OUTPATIENT)
Dept: RADIOLOGY | Facility: CLINIC | Age: 59
Discharge: HOME | End: 2024-02-16
Attending: OBSTETRICS & GYNECOLOGY
Payer: COMMERCIAL

## 2024-02-16 ENCOUNTER — TRANSCRIBE ORDERS (OUTPATIENT)
Dept: REGISTRATION | Facility: CLINIC | Age: 59
End: 2024-02-16

## 2024-02-16 DIAGNOSIS — Z12.31 ENCOUNTER FOR SCREENING MAMMOGRAM FOR MALIGNANT NEOPLASM OF BREAST: Primary | ICD-10-CM

## 2024-02-16 DIAGNOSIS — Z12.31 ENCOUNTER FOR SCREENING MAMMOGRAM FOR MALIGNANT NEOPLASM OF BREAST: ICD-10-CM

## 2024-02-16 PROCEDURE — 77067 SCR MAMMO BI INCL CAD: CPT

## 2024-02-26 DIAGNOSIS — E03.9 HYPOTHYROIDISM, UNSPECIFIED TYPE: ICD-10-CM

## 2024-02-26 DIAGNOSIS — L70.9 ACNE, UNSPECIFIED ACNE TYPE: ICD-10-CM

## 2024-02-26 RX ORDER — SPIRONOLACTONE 100 MG/1
100 TABLET, FILM COATED ORAL DAILY
Qty: 90 TABLET | Refills: 1 | Status: SHIPPED | OUTPATIENT
Start: 2024-02-26 | End: 2024-05-24 | Stop reason: SDUPTHER

## 2024-02-26 RX ORDER — LEVOTHYROXINE SODIUM 175 UG/1
175 TABLET ORAL DAILY
Qty: 90 TABLET | Refills: 1 | Status: SHIPPED | OUTPATIENT
Start: 2024-02-26 | End: 2024-05-20 | Stop reason: SDUPTHER

## 2024-05-20 DIAGNOSIS — E03.9 HYPOTHYROIDISM, UNSPECIFIED TYPE: ICD-10-CM

## 2024-05-20 RX ORDER — LEVOTHYROXINE SODIUM 175 UG/1
175 TABLET ORAL DAILY
Qty: 90 TABLET | Refills: 0 | Status: SHIPPED | OUTPATIENT
Start: 2024-05-20 | End: 2024-07-26

## 2024-05-24 DIAGNOSIS — L70.9 ACNE, UNSPECIFIED ACNE TYPE: ICD-10-CM

## 2024-05-24 RX ORDER — SPIRONOLACTONE 100 MG/1
100 TABLET, FILM COATED ORAL DAILY
Qty: 90 TABLET | Refills: 1 | Status: SHIPPED | OUTPATIENT
Start: 2024-05-24 | End: 2024-06-04 | Stop reason: SDUPTHER

## 2024-06-04 DIAGNOSIS — L70.9 ACNE, UNSPECIFIED ACNE TYPE: ICD-10-CM

## 2024-06-04 RX ORDER — SPIRONOLACTONE 100 MG/1
100 TABLET, FILM COATED ORAL DAILY
Qty: 90 TABLET | Refills: 1 | Status: SHIPPED | OUTPATIENT
Start: 2024-06-04 | End: 2024-11-18

## 2024-06-25 SDOH — ECONOMIC STABILITY: FOOD INSECURITY: WITHIN THE PAST 12 MONTHS, THE FOOD YOU BOUGHT JUST DIDN'T LAST AND YOU DIDN'T HAVE MONEY TO GET MORE.: NEVER TRUE

## 2024-06-25 SDOH — ECONOMIC STABILITY: FOOD INSECURITY: WITHIN THE PAST 12 MONTHS, YOU WORRIED THAT YOUR FOOD WOULD RUN OUT BEFORE YOU GOT MONEY TO BUY MORE.: NEVER TRUE

## 2024-06-25 SDOH — ECONOMIC STABILITY: INCOME INSECURITY: IN THE LAST 12 MONTHS, WAS THERE A TIME WHEN YOU WERE NOT ABLE TO PAY THE MORTGAGE OR RENT ON TIME?: NO

## 2024-06-25 SDOH — ECONOMIC STABILITY: TRANSPORTATION INSECURITY
IN THE PAST 12 MONTHS, HAS THE LACK OF TRANSPORTATION KEPT YOU FROM MEDICAL APPOINTMENTS OR FROM GETTING MEDICATIONS?: NO

## 2024-06-25 SDOH — ECONOMIC STABILITY: TRANSPORTATION INSECURITY
IN THE PAST 12 MONTHS, HAS LACK OF TRANSPORTATION KEPT YOU FROM MEETINGS, WORK, OR FROM GETTING THINGS NEEDED FOR DAILY LIVING?: NO

## 2024-06-25 ASSESSMENT — SOCIAL DETERMINANTS OF HEALTH (SDOH): IN THE PAST 12 MONTHS, HAS THE ELECTRIC, GAS, OIL, OR WATER COMPANY THREATENED TO SHUT OFF SERVICE IN YOUR HOME?: NO

## 2024-06-28 ENCOUNTER — OFFICE VISIT (OUTPATIENT)
Dept: PRIMARY CARE | Facility: CLINIC | Age: 59
End: 2024-06-28
Payer: COMMERCIAL

## 2024-06-28 VITALS
HEIGHT: 63 IN | DIASTOLIC BLOOD PRESSURE: 80 MMHG | OXYGEN SATURATION: 97 % | TEMPERATURE: 97.6 F | BODY MASS INDEX: 31.89 KG/M2 | WEIGHT: 180 LBS | RESPIRATION RATE: 16 BRPM | HEART RATE: 84 BPM | SYSTOLIC BLOOD PRESSURE: 128 MMHG

## 2024-06-28 DIAGNOSIS — E06.3 HYPOTHYROIDISM DUE TO HASHIMOTO'S THYROIDITIS: ICD-10-CM

## 2024-06-28 DIAGNOSIS — Z00.00 ROUTINE PHYSICAL EXAMINATION: Primary | ICD-10-CM

## 2024-06-28 DIAGNOSIS — L70.8 OTHER ACNE: ICD-10-CM

## 2024-06-28 PROCEDURE — 99396 PREV VISIT EST AGE 40-64: CPT | Performed by: FAMILY MEDICINE

## 2024-06-28 PROCEDURE — 3008F BODY MASS INDEX DOCD: CPT | Performed by: FAMILY MEDICINE

## 2024-06-28 ASSESSMENT — ENCOUNTER SYMPTOMS
FREQUENCY: 0
DIZZINESS: 0
HEADACHES: 0
FEVER: 0
MYALGIAS: 0
SHORTNESS OF BREATH: 0
HEMATURIA: 0
ARTHRALGIAS: 0
BLOOD IN STOOL: 0
CONSTIPATION: 0
WHEEZING: 0
ABDOMINAL PAIN: 0
NERVOUS/ANXIOUS: 0
DYSURIA: 0
SORE THROAT: 0
CHILLS: 0
PALPITATIONS: 0
COUGH: 0
RHINORRHEA: 0
FATIGUE: 0
SLEEP DISTURBANCE: 0

## 2024-06-28 ASSESSMENT — PATIENT HEALTH QUESTIONNAIRE - PHQ9: SUM OF ALL RESPONSES TO PHQ9 QUESTIONS 1 & 2: 0

## 2024-06-28 NOTE — PROGRESS NOTES
Subjective      Patient ID: Arelis Chin is a 58 y.o. female.    She is here for physical. Pt has been doing pretty good overall. She is trying to follow well balanced meals and does have small freq meals. Stays well hydrated. She is active at work but knows she could be better but does try to get walks in. Average 7hrs of sleep a night and having hard time staying asleep and is planning to take magnesium to help. Normal BMS and urination. Due for colonoscopy.  She is dealing with anxiety and tightness in chest/palpitations recently and does know her hormones are not helping. She does have some tender breasts and some pain in her adhesions. UTD with eye and dental exam. UTD with gyn care. UTD with mammo. Due for updated labs. UTD with immunizations.       The following have been reviewed and updated as appropriate in this visit:   Tobacco  Allergies  Meds  Problems  Med Hx  Surg Hx  Fam Hx       Review of Systems   Constitutional:  Negative for chills, fatigue and fever.   HENT:  Negative for ear discharge, ear pain, postnasal drip, rhinorrhea and sore throat.    Respiratory:  Negative for cough, shortness of breath and wheezing.    Cardiovascular:  Negative for chest pain and palpitations.   Gastrointestinal:  Negative for abdominal pain, blood in stool and constipation.   Genitourinary:  Negative for dysuria, frequency and hematuria.   Musculoskeletal:  Negative for arthralgias and myalgias.   Skin:  Negative for rash.   Neurological:  Negative for dizziness and headaches.   Psychiatric/Behavioral:  Negative for sleep disturbance and suicidal ideas. The patient is not nervous/anxious.        Current Outpatient Medications   Medication Sig Dispense Refill   • cholecalciferol, vitamin D3, 1,000 unit (25 mcg) tablet Take 2,000 Units by mouth daily.     • conj estrog-medroxyprogest ace (PREMPRO) 0.45-1.5 mg per tablet take 1 tablet by oral route  every day     • levothyroxine (SYNTHROID) 175 mcg  tablet Take 1 tablet (175 mcg total) by mouth daily. 90 tablet 0   • spironolactone (ALDACTONE) 100 mg tablet Take 1 tablet (100 mg total) by mouth daily. 90 tablet 1     No current facility-administered medications for this visit.     Past Medical History:   Diagnosis Date   • Acne    • COVID-19 01/2024   • Hypothyroidism    • Seasonal allergies      Family History   Problem Relation Age of Onset   • Thyroid disease Biological Mother    • Diabetes Biological Mother    • Kidney disease Biological Mother    • Diabetes Biological Father    • Heart disease Biological Father    • Breast cancer Biological Sister    • Deep vein thrombosis Biological Sister    • Thyroid disease Biological Sister    • Thyroid disease Biological Brother    • No Known Problems Maternal Grandmother    • No Known Problems Maternal Grandfather    • No Known Problems Paternal Grandmother    • No Known Problems Paternal Grandfather      Past Surgical History:   Procedure Laterality Date   • COLONOSCOPY  2019    every 5yrs   • ENDOMETRIAL ABLATION      heavy periods   • LASER ABLATION OF THE CERVIX       Social History     Socioeconomic History   • Marital status:      Spouse name: Not on file   • Number of children: 4   • Years of education: Not on file   • Highest education level: Not on file   Occupational History   • Occupation: Medical Coding   Tobacco Use   • Smoking status: Never   • Smokeless tobacco: Never   Vaping Use   • Vaping Use: Never used   Substance and Sexual Activity   • Alcohol use: Yes     Comment: 2-3 weely    • Drug use: Never   • Sexual activity: Yes     Partners: Male     Birth control/protection: None   Other Topics Concern   • Not on file   Social History Narrative   • Not on file     Social Determinants of Health     Financial Resource Strain: Not on file   Food Insecurity: No Food Insecurity (6/25/2024)    Hunger Vital Sign    • Worried About Running Out of Food in the Last Year: Never true    • Ran Out of Food  "in the Last Year: Never true   Transportation Needs: No Transportation Needs (6/25/2024)    PRAPARE - Transportation    • Lack of Transportation (Medical): No    • Lack of Transportation (Non-Medical): No   Physical Activity: Not on file   Stress: Not on file   Social Connections: Not on file   Intimate Partner Violence: Not on file   Housing Stability: Low Risk  (6/25/2024)    Housing Stability Vital Sign    • Unable to Pay for Housing in the Last Year: No    • Number of Places Lived in the Last Year: 1    • Unstable Housing in the Last Year: No     Allergies   Allergen Reactions   • Hydromorphone      Other reaction(s): eye tearing   • Sulfamethoxazole Hives   • Trimethoprim Hives       Objective   Visit Vitals  /80 (BP Location: Left upper arm, Patient Position: Sitting)   Pulse 84   Temp 36.4 °C (97.6 °F) (Temporal)   Resp 16   Ht 1.6 m (5' 3\")   Wt 81.6 kg (180 lb) Comment: without shoes   SpO2 97%   BMI 31.89 kg/m²     Physical Exam  Vitals and nursing note reviewed.   Constitutional:       Appearance: Normal appearance.   HENT:      Right Ear: Tympanic membrane normal. There is no impacted cerumen.      Left Ear: Tympanic membrane normal. There is no impacted cerumen.      Nose: Nose normal. No rhinorrhea.      Mouth/Throat:      Mouth: Mucous membranes are moist.      Pharynx: Oropharynx is clear. No posterior oropharyngeal erythema.   Cardiovascular:      Rate and Rhythm: Normal rate and regular rhythm.      Heart sounds: No murmur heard.  Pulmonary:      Effort: Pulmonary effort is normal.      Breath sounds: Normal breath sounds. No wheezing.   Abdominal:      General: Bowel sounds are normal.      Palpations: Abdomen is soft.      Tenderness: There is no abdominal tenderness.   Musculoskeletal:         General: Normal range of motion.      Cervical back: Normal range of motion and neck supple.   Skin:     General: Skin is warm.      Findings: No rash.   Neurological:      General: No focal deficit " present.      Mental Status: She is alert and oriented to person, place, and time.      Cranial Nerves: No cranial nerve deficit.   Psychiatric:         Mood and Affect: Mood normal.         Behavior: Behavior normal.       Assessment/Plan   Problem List Items Addressed This Visit        Endocrine/Metabolic    Hypothyroidism    Relevant Orders    TSH    T4, free       Dermatologic    Other acne   Other Visit Diagnoses     Routine physical examination    -  Primary    Relevant Orders    Comprehensive metabolic panel    CBC    Hemoglobin A1c    Lipid panel      She is doing ok overall. We discussed doing better with diet and exercise. Will check updated fasting labs. We discussed maybe doing calcium score pending how her cholesterol looks this week. UTD with eye and dental exam. UTD with gyn care. UTD with mammo. She will be setting up colonoscopy soon. UTD with immunizations. She will let me know if her anxiety or other symptoms persist and we will need to do more work up.     Eloisa Domingo, DO  6/28/2024

## 2024-07-18 DIAGNOSIS — Z00.6 ENCOUNTER FOR EXAMINATION FOR NORMAL COMPARISON OR CONTROL IN CLINICAL RESEARCH PROGRAM: ICD-10-CM

## 2024-07-26 DIAGNOSIS — E03.9 HYPOTHYROIDISM, UNSPECIFIED TYPE: ICD-10-CM

## 2024-07-26 RX ORDER — LEVOTHYROXINE SODIUM 175 UG/1
175 TABLET ORAL DAILY
Qty: 90 TABLET | Refills: 3 | Status: SHIPPED | OUTPATIENT
Start: 2024-07-26

## 2024-08-03 LAB
ALBUMIN SERPL-MCNC: 4.2 G/DL (ref 3.8–4.9)
ALP SERPL-CCNC: 102 IU/L (ref 44–121)
ALT SERPL-CCNC: 13 IU/L (ref 0–32)
AST SERPL-CCNC: 13 IU/L (ref 0–40)
BASOPHILS # BLD AUTO: 0 X10E3/UL (ref 0–0.2)
BASOPHILS NFR BLD AUTO: 1 %
BILIRUB SERPL-MCNC: 0.6 MG/DL (ref 0–1.2)
BUN SERPL-MCNC: 16 MG/DL (ref 6–24)
BUN/CREAT SERPL: 17 (ref 9–23)
CALCIUM SERPL-MCNC: 9.4 MG/DL (ref 8.7–10.2)
CHLORIDE SERPL-SCNC: 102 MMOL/L (ref 96–106)
CHOLEST SERPL-MCNC: 190 MG/DL (ref 100–199)
CO2 SERPL-SCNC: 22 MMOL/L (ref 20–29)
CREAT SERPL-MCNC: 0.92 MG/DL (ref 0.57–1)
EGFRCR SERPLBLD CKD-EPI 2021: 72 ML/MIN/1.73
EOSINOPHIL # BLD AUTO: 0.1 X10E3/UL (ref 0–0.4)
EOSINOPHIL NFR BLD AUTO: 2 %
ERYTHROCYTE [DISTWIDTH] IN BLOOD BY AUTOMATED COUNT: 12.1 % (ref 11.7–15.4)
GLOBULIN SER CALC-MCNC: 2.4 G/DL (ref 1.5–4.5)
GLUCOSE SERPL-MCNC: 90 MG/DL (ref 70–99)
HBA1C MFR BLD: 5.2 % (ref 4.8–5.6)
HCT VFR BLD AUTO: 43.5 % (ref 34–46.6)
HDLC SERPL-MCNC: 54 MG/DL
HGB BLD-MCNC: 14.7 G/DL (ref 11.1–15.9)
IMM GRANULOCYTES # BLD AUTO: 0 X10E3/UL (ref 0–0.1)
IMM GRANULOCYTES NFR BLD AUTO: 0 %
LDLC SERPL CALC-MCNC: 113 MG/DL (ref 0–99)
LYMPHOCYTES # BLD AUTO: 2 X10E3/UL (ref 0.7–3.1)
LYMPHOCYTES NFR BLD AUTO: 35 %
MCH RBC QN AUTO: 31.3 PG (ref 26.6–33)
MCHC RBC AUTO-ENTMCNC: 33.8 G/DL (ref 31.5–35.7)
MCV RBC AUTO: 93 FL (ref 79–97)
MONOCYTES # BLD AUTO: 0.5 X10E3/UL (ref 0.1–0.9)
MONOCYTES NFR BLD AUTO: 8 %
NEUTROPHILS # BLD AUTO: 3.1 X10E3/UL (ref 1.4–7)
NEUTROPHILS NFR BLD AUTO: 54 %
PLATELET # BLD AUTO: 236 X10E3/UL (ref 150–450)
POTASSIUM SERPL-SCNC: 4.4 MMOL/L (ref 3.5–5.2)
PROT SERPL-MCNC: 6.6 G/DL (ref 6–8.5)
RBC # BLD AUTO: 4.69 X10E6/UL (ref 3.77–5.28)
SODIUM SERPL-SCNC: 137 MMOL/L (ref 134–144)
SPECIMEN STATUS: NORMAL
T4 FREE SERPL-MCNC: 1.86 NG/DL (ref 0.82–1.77)
TRIGL SERPL-MCNC: 127 MG/DL (ref 0–149)
TSH SERPL DL<=0.005 MIU/L-ACNC: 0.72 UIU/ML (ref 0.45–4.5)
VLDLC SERPL CALC-MCNC: 23 MG/DL (ref 5–40)
WBC # BLD AUTO: 5.7 X10E3/UL (ref 3.4–10.8)

## 2024-08-08 DIAGNOSIS — E06.3 HYPOTHYROIDISM DUE TO HASHIMOTO'S THYROIDITIS: Primary | ICD-10-CM

## 2024-08-17 ENCOUNTER — APPOINTMENT (OUTPATIENT)
Dept: LAB | Facility: CLINIC | Age: 59
End: 2024-08-17

## 2024-08-17 DIAGNOSIS — Z00.6 ENCOUNTER FOR EXAMINATION FOR NORMAL COMPARISON OR CONTROL IN CLINICAL RESEARCH PROGRAM: ICD-10-CM

## 2024-08-17 PROCEDURE — 36415 COLL VENOUS BLD VENIPUNCTURE: CPT

## 2024-08-28 LAB
APOB+LDLR+PCSK9 GENE MUT ANL BLD/T: NOT DETECTED
BRCA1+BRCA2 DEL+DUP + FULL MUT ANL BLD/T: NOT DETECTED
MLH1+MSH2+MSH6+PMS2 GN DEL+DUP+FUL M: NOT DETECTED

## 2024-11-17 DIAGNOSIS — L70.9 ACNE, UNSPECIFIED ACNE TYPE: ICD-10-CM

## 2024-11-18 RX ORDER — SPIRONOLACTONE 100 MG/1
100 TABLET, FILM COATED ORAL DAILY
Qty: 90 TABLET | Refills: 1 | Status: SHIPPED | OUTPATIENT
Start: 2024-11-18 | End: 2025-03-05 | Stop reason: SDUPTHER

## 2025-02-27 LAB
T4 FREE SERPL-MCNC: 1.52 NG/DL (ref 0.82–1.77)
TSH SERPL DL<=0.005 MIU/L-ACNC: 2.17 UIU/ML (ref 0.45–4.5)

## 2025-03-05 DIAGNOSIS — L70.9 ACNE, UNSPECIFIED ACNE TYPE: ICD-10-CM

## 2025-03-05 RX ORDER — SPIRONOLACTONE 100 MG/1
100 TABLET, FILM COATED ORAL DAILY
Qty: 90 TABLET | Refills: 3 | Status: SHIPPED | OUTPATIENT
Start: 2025-03-05

## 2025-05-29 ENCOUNTER — TELEPHONE (OUTPATIENT)
Dept: PRIMARY CARE | Facility: CLINIC | Age: 60
End: 2025-05-29

## 2025-05-29 NOTE — TELEPHONE ENCOUNTER
[facility] Appointment Request   Provider: Eloisa Domingo DO  Appointment Type: Pre-op  Reason for Visit: jaw surgery June 11 with Dr. Jac Sanchez  Available Day and Time: any   Best Contact Number: [phone]    Patient attempted to schedule pre-op prior to June 11 surgery on jaw. Currently, soonest available opening populating for PCP or NP is June 23. Patient is requesting call if opening occurs prior to surgery.     The practice will reach out to schedule your appointment within the next 2 business days.

## 2025-06-03 ENCOUNTER — CONSULT (OUTPATIENT)
Dept: PRIMARY CARE | Facility: CLINIC | Age: 60
End: 2025-06-03
Payer: COMMERCIAL

## 2025-06-03 VITALS
TEMPERATURE: 98.3 F | HEART RATE: 83 BPM | OXYGEN SATURATION: 99 % | WEIGHT: 184.6 LBS | BODY MASS INDEX: 31.51 KG/M2 | HEIGHT: 64 IN | RESPIRATION RATE: 12 BRPM | DIASTOLIC BLOOD PRESSURE: 84 MMHG | SYSTOLIC BLOOD PRESSURE: 126 MMHG

## 2025-06-03 DIAGNOSIS — Z01.818 PRE-OPERATIVE CLEARANCE: Primary | ICD-10-CM

## 2025-06-03 DIAGNOSIS — J30.1 ALLERGIC RHINITIS DUE TO POLLEN, UNSPECIFIED SEASONALITY: ICD-10-CM

## 2025-06-03 DIAGNOSIS — L70.8 OTHER ACNE: ICD-10-CM

## 2025-06-03 DIAGNOSIS — E06.3 HYPOTHYROIDISM DUE TO HASHIMOTO THYROIDITIS: ICD-10-CM

## 2025-06-03 PROCEDURE — 99214 OFFICE O/P EST MOD 30 MIN: CPT | Performed by: NURSE PRACTITIONER

## 2025-06-03 PROCEDURE — 3008F BODY MASS INDEX DOCD: CPT | Performed by: NURSE PRACTITIONER

## 2025-06-03 RX ORDER — LANOLIN ALCOHOL/MO/W.PET/CERES
CREAM (GRAM) TOPICAL
COMMUNITY

## 2025-06-03 ASSESSMENT — ENCOUNTER SYMPTOMS
JOINT SWELLING: 0
NERVOUS/ANXIOUS: 0
SORE THROAT: 0
CHILLS: 0
CONSTIPATION: 0
BACK PAIN: 0
EYE ITCHING: 0
FREQUENCY: 0
NAUSEA: 0
WEAKNESS: 0
COUGH: 0
SLEEP DISTURBANCE: 0
PALPITATIONS: 0
EYE REDNESS: 0
ABDOMINAL PAIN: 0
WHEEZING: 0
VOMITING: 0
HEMATURIA: 0
SINUS PRESSURE: 0
WOUND: 0
FEVER: 0
DYSURIA: 0
DIZZINESS: 0
DIARRHEA: 0
HEADACHES: 0

## 2025-06-03 NOTE — PROGRESS NOTES
Pre-Op Evaluation:  [facility]   Arelis Chin is a 59 y.o. female presenting for a preoperative evaluation for planned Arthroscopy Left TMJ by Jac Sweeney on 6/11/2025 surgical center in [facility]. She is going for pre admission testing tomorrow EKG, CXR and blood work. No complaints today.       Functional Capacity Assessment  19           Review of Systems     Review of Systems   Constitutional:  Negative for chills and fever.   HENT:  Negative for congestion, ear pain, sinus pressure, sneezing and sore throat.    Eyes:  Negative for redness, itching and visual disturbance.   Respiratory:  Negative for cough and wheezing.    Cardiovascular:  Negative for chest pain and palpitations.   Gastrointestinal:  Negative for abdominal pain, constipation, diarrhea, nausea and vomiting.   Endocrine: Negative for cold intolerance and polyuria.   Genitourinary:  Negative for dysuria, frequency and hematuria.   Musculoskeletal:  Negative for back pain and joint swelling.   Skin:  Negative for rash and wound.   Allergic/Immunologic: Negative for environmental allergies and food allergies.   Neurological:  Negative for dizziness, weakness and headaches.   Psychiatric/Behavioral:  Negative for sleep disturbance. The patient is not nervous/anxious.    All other systems reviewed and are negative.            Patient History      Past Medical History:  Dione  has a past medical history of Acne, COVID-19 (01/2024), Hypothyroidism, and Seasonal allergies.    Past Surgical History:   Patient denies history of adverse reaction to anesthesia.  Dione  has a past surgical history that includes Laser ablation of the cervix; Colonoscopy (2019); and Endometrial ablation.    Medications:     Current Outpatient Medications:     cholecalciferol, vitamin D3, 1,000 unit (25 mcg) tablet, Take 2,000 Units by mouth daily., Disp: , Rfl:     conj estrog-medroxyprogest ace (PREMPRO) 0.45-1.5 mg per tablet, take 1 tablet by oral route  every  "day, Disp: , Rfl:     levothyroxine (SYNTHROID) 175 mcg tablet, TAKE 1 TABLET BY MOUTH DAILY, Disp: 90 tablet, Rfl: 3    spironolactone (ALDACTONE) 100 mg tablet, Take 1 tablet (100 mg total) by mouth daily., Disp: 90 tablet, Rfl: 3    cyanocobalamin (vitamin B-12) 1,000 mcg tablet, Vitamin B12, Disp: , Rfl:     Allergies:   Dione is allergic to hydromorphone, sulfamethoxazole, and trimethoprim.    Family History:  Negative for adverse reaction to anesthesia nor temperature regulation issues after anesthesia.    Social History:   Negative for tobacco alcohol or illicit drug use.    Patient has no history of the following:   Moderate valvular disease, ACID, pulmonary hypertension, congenital heart disease, severe systemic disease, obstructive sleep apnea.       Revised Cardiac Risk Index 0      Risk of Major Cardiac Event 3.9%           Objective   Visit Vitals  /84 (BP Location: Left upper arm, Patient Position: Sitting)   Pulse 83   Temp 36.8 °C (98.3 °F) (Temporal)   Resp 12   Ht 1.613 m (5' 3.5\")   Wt 83.7 kg (184 lb 9.6 oz)   LMP  (LMP Unknown)   SpO2 99%   Breastfeeding No   BMI 32.19 kg/m²     Physical Exam  Vitals reviewed.   Constitutional:       General: She is not in acute distress.     Appearance: Normal appearance. She is not toxic-appearing.   HENT:      Head: Normocephalic and atraumatic.      Right Ear: Tympanic membrane, ear canal and external ear normal. There is no impacted cerumen.      Left Ear: Tympanic membrane, ear canal and external ear normal. There is no impacted cerumen.      Nose: No congestion.      Mouth/Throat:      Mouth: Mucous membranes are moist.      Pharynx: Oropharynx is clear.   Eyes:      General: No scleral icterus.        Right eye: No discharge.         Left eye: No discharge.      Conjunctiva/sclera: Conjunctivae normal.      Pupils: Pupils are equal, round, and reactive to light.   Cardiovascular:      Rate and Rhythm: Normal rate and regular rhythm.      Heart " sounds: Normal heart sounds. No murmur heard.     No gallop.   Pulmonary:      Effort: Pulmonary effort is normal.      Breath sounds: Normal breath sounds. No wheezing, rhonchi or rales.   Abdominal:      General: Bowel sounds are normal.      Palpations: Abdomen is soft. There is no mass.      Tenderness: There is no abdominal tenderness.   Musculoskeletal:         General: No swelling. Normal range of motion.      Cervical back: Normal range of motion and neck supple. No rigidity.      Right lower leg: No edema.      Left lower leg: No edema.   Skin:     General: Skin is warm and dry.      Findings: No rash.   Neurological:      General: No focal deficit present.      Mental Status: She is alert and oriented to person, place, and time.      Motor: No weakness.   Psychiatric:         Mood and Affect: Mood normal.         Behavior: Behavior normal.         Thought Content: Thought content normal.         Judgment: Judgment normal.         Recent [facility] Labs:  Lab Results   Component Value Date    GLUCOSE 90 08/02/2024    CALCIUM 9.7 04/28/2023     08/02/2024    K 4.4 08/02/2024    CO2 22 08/02/2024     08/02/2024    BUN 16 08/02/2024    CREATININE 0.92 08/02/2024         Meds        Assessment/Plan     Diagnoses and all orders for this visit:    Pre-operative clearance (Primary)  Assessment & Plan:  Patient is going for left TMJ arthroscopy on 6/11/2025   stable physical exam  History of acne, hypothyroidism and seasonal allergy  Stable condition with the medication  With given history patient is cleared for this procedure with acceptable risk       Hypothyroidism due to Hashimoto thyroiditis  Assessment & Plan:  Levothyroxine 175 mcg p.o. daily  Blood work as ordered  Stable condition with medication      Other acne  Assessment & Plan:  Spironolactone 100 mg p.o. daily      Allergic rhinitis due to pollen, unspecified seasonality  Assessment & Plan:  Stable condition  Not on any medication                  ALEX Mcdaniel        06/03/25  4:34 PM

## 2025-06-03 NOTE — ASSESSMENT & PLAN NOTE
Patient is going for left TMJ arthroscopy on 6/11/2025   stable physical exam  History of acne, hypothyroidism and seasonal allergy  Stable condition with the medication  With given history patient is cleared for this procedure with acceptable risk

## 2025-07-01 SDOH — ECONOMIC STABILITY: FOOD INSECURITY: WITHIN THE PAST 12 MONTHS, YOU WORRIED THAT YOUR FOOD WOULD RUN OUT BEFORE YOU GOT MONEY TO BUY MORE.: NEVER TRUE

## 2025-07-01 SDOH — ECONOMIC STABILITY: INCOME INSECURITY: IN THE LAST 12 MONTHS, WAS THERE A TIME WHEN YOU WERE NOT ABLE TO PAY THE MORTGAGE OR RENT ON TIME?: NO

## 2025-07-01 SDOH — ECONOMIC STABILITY: FOOD INSECURITY: WITHIN THE PAST 12 MONTHS, THE FOOD YOU BOUGHT JUST DIDN'T LAST AND YOU DIDN'T HAVE MONEY TO GET MORE.: NEVER TRUE

## 2025-07-01 ASSESSMENT — SOCIAL DETERMINANTS OF HEALTH (SDOH): IN THE PAST 12 MONTHS, HAS THE ELECTRIC, GAS, OIL, OR WATER COMPANY THREATENED TO SHUT OFF SERVICE IN YOUR HOME?: NO

## 2025-07-02 ENCOUNTER — OFFICE VISIT (OUTPATIENT)
Dept: PRIMARY CARE | Facility: CLINIC | Age: 60
End: 2025-07-02
Payer: COMMERCIAL

## 2025-07-02 VITALS
TEMPERATURE: 97.5 F | HEART RATE: 77 BPM | OXYGEN SATURATION: 98 % | HEIGHT: 63 IN | BODY MASS INDEX: 32.71 KG/M2 | SYSTOLIC BLOOD PRESSURE: 130 MMHG | RESPIRATION RATE: 17 BRPM | WEIGHT: 184.6 LBS | DIASTOLIC BLOOD PRESSURE: 80 MMHG

## 2025-07-02 DIAGNOSIS — E06.3 HYPOTHYROIDISM DUE TO HASHIMOTO THYROIDITIS: ICD-10-CM

## 2025-07-02 DIAGNOSIS — Z00.00 ROUTINE PHYSICAL EXAMINATION: Primary | ICD-10-CM

## 2025-07-02 DIAGNOSIS — F41.9 ANXIETY: ICD-10-CM

## 2025-07-02 DIAGNOSIS — Z12.31 ENCOUNTER FOR SCREENING MAMMOGRAM FOR MALIGNANT NEOPLASM OF BREAST: ICD-10-CM

## 2025-07-02 DIAGNOSIS — Z23 NEED FOR PNEUMOCOCCAL VACCINE: ICD-10-CM

## 2025-07-02 DIAGNOSIS — L70.8 OTHER ACNE: ICD-10-CM

## 2025-07-02 PROBLEM — E66.811 CLASS 1 OBESITY DUE TO EXCESS CALORIES WITH SERIOUS COMORBIDITY AND BODY MASS INDEX (BMI) OF 33.0 TO 33.9 IN ADULT: Status: ACTIVE | Noted: 2025-07-02

## 2025-07-02 PROBLEM — Z01.818 PRE-OPERATIVE CLEARANCE: Status: RESOLVED | Noted: 2025-06-03 | Resolved: 2025-07-02

## 2025-07-02 PROBLEM — E66.09 CLASS 1 OBESITY DUE TO EXCESS CALORIES WITH SERIOUS COMORBIDITY AND BODY MASS INDEX (BMI) OF 33.0 TO 33.9 IN ADULT: Status: ACTIVE | Noted: 2025-07-02

## 2025-07-02 PROCEDURE — 3008F BODY MASS INDEX DOCD: CPT | Performed by: FAMILY MEDICINE

## 2025-07-02 PROCEDURE — 99396 PREV VISIT EST AGE 40-64: CPT | Mod: 25 | Performed by: FAMILY MEDICINE

## 2025-07-02 PROCEDURE — 90677 PCV20 VACCINE IM: CPT | Performed by: FAMILY MEDICINE

## 2025-07-02 PROCEDURE — 90471 IMMUNIZATION ADMIN: CPT | Performed by: FAMILY MEDICINE

## 2025-07-02 RX ORDER — BUPROPION HYDROCHLORIDE 150 MG/1
150 TABLET ORAL DAILY
Qty: 30 TABLET | Refills: 1 | Status: SHIPPED | OUTPATIENT
Start: 2025-07-02 | End: 2026-07-02

## 2025-07-02 ASSESSMENT — ENCOUNTER SYMPTOMS
CHILLS: 0
FREQUENCY: 0
SHORTNESS OF BREATH: 0
WHEEZING: 0
BLOOD IN STOOL: 0
COUGH: 0
DIZZINESS: 0
SLEEP DISTURBANCE: 1
DYSURIA: 0
MYALGIAS: 0
ARTHRALGIAS: 0
CONSTIPATION: 0
SORE THROAT: 0
HEMATURIA: 0
FEVER: 0
HEADACHES: 0
ABDOMINAL PAIN: 0
PALPITATIONS: 0
RHINORRHEA: 0
FATIGUE: 0
NERVOUS/ANXIOUS: 1

## 2025-07-02 ASSESSMENT — PATIENT HEALTH QUESTIONNAIRE - PHQ9: SUM OF ALL RESPONSES TO PHQ9 QUESTIONS 1 & 2: 0

## 2025-07-02 NOTE — PROGRESS NOTES
Subjective      Patient ID: Arelis Chin is a 59 y.o. female.    She is here for physical. She is doing ok overall. She is trying to get back on track with better diet and lately has gotten somewhat better. She is not exercising like she should be. Average 7hrs of sleep a night but can fall asleep but cannot stay asleep and will need her 's benzo at times. Stays well hydrated. Normal BMS (can be irregular) and urination. 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 She is also stressed b/c her  lost his job and now she is the breadwinner. She has been dealing with a lot of anxiety with it. Due for colonoscopy. Due for mammo. Seeing ob/gyn in the fall. UTD with eye and dental exam. Due for derm check. She is due for prevnar 20. Her  and her children are on medications. Her  is on wellbutrin and children on celexa. She is thinking she needs to try something. She also may need to see another doc in her [employer].         The following have been reviewed and updated as appropriate in this visit:   Tobacco  Allergies  Meds  Problems  Med Hx  Surg Hx  Fam Hx       Review of Systems   Constitutional:  Negative for chills, fatigue and fever.   HENT:  Negative for ear discharge, ear pain, postnasal drip, rhinorrhea and sore throat.    Respiratory:  Negative for cough, shortness of breath and wheezing.    Cardiovascular:  Negative for chest pain and palpitations.   Gastrointestinal:  Negative for abdominal pain, blood in stool and constipation.   Genitourinary:  Negative for dysuria, frequency and hematuria.   Musculoskeletal:  Negative for arthralgias and myalgias.   Skin:  Negative for rash.   Neurological:  Negative for dizziness and headaches.   Psychiatric/Behavioral:  Positive for sleep disturbance. Negative for suicidal ideas. The patient is nervous/anxious.         Current Outpatient Medications   Medication Sig Dispense Refill    buPROPion XL (WELLBUTRIN XL) 150 mg 24 hr tablet Take 1 tablet (150 mg total) by mouth daily. 30 tablet 1    cholecalciferol, vitamin D3, 1,000 unit (25 mcg) tablet Take 2,000 Units by mouth daily.      conj estrog-medroxyprogest ace (PREMPRO) 0.45-1.5 mg per tablet take 1 tablet by oral route  every day      cyanocobalamin (vitamin B-12) 1,000 mcg tablet Vitamin B12      levothyroxine (SYNTHROID) 175 mcg tablet TAKE 1 TABLET BY MOUTH DAILY 90 tablet 3    spironolactone (ALDACTONE) 100 mg tablet Take 1 tablet (100 mg total) by mouth daily. 90 tablet 3     No current facility-administered medications for this visit.     Past Medical History:   Diagnosis Date    Acne     Anxiety     COVID-19 01/2024    Hypothyroidism     Seasonal allergies      Family History   Problem Relation Name Age of Onset    Thyroid disease Biological Mother      Diabetes Biological Mother      Kidney disease Biological Mother      Diabetes Biological Father      Heart disease Biological Father      Breast cancer Biological Sister      Deep vein thrombosis Biological Sister      Thyroid disease Biological Sister      Thyroid disease Biological Brother      No Known Problems Maternal Grandmother      No Known Problems Maternal Grandfather      No Known Problems Paternal Grandmother      No Known Problems Paternal Grandfather       Past Surgical History   Procedure Laterality Date    Colonoscopy  2019    every 5yrs    Endometrial ablation      heavy periods    Laser ablation of the cervix      Temporomandibular joint arthroscopy  06/2025     Social History     Socioeconomic History    Marital status:      Spouse name: Not on file    Number of children: 4    Years of education: Not on file    Highest education level: Not on file   Occupational History    Occupation: Medical Coding   Tobacco Use    Smoking status: Never    Smokeless tobacco: Never   Vaping Use    Vaping  "status: Never Used   Substance and Sexual Activity    Alcohol use: Yes     Comment: 2-3 weely     Drug use: Never    Sexual activity: Yes     Partners: Male     Birth control/protection: None   Other Topics Concern    Not on file   Social History Narrative    Not on file     Social Drivers of Health     Financial Resource Strain: Not on file   Food Insecurity: No Food Insecurity (7/1/2025)    Hunger Vital Sign     Worried About Running Out of Food in the Last Year: Never true     Ran Out of Food in the Last Year: Never true   Transportation Needs: No Transportation Needs (7/1/2025)    PRAPARE - Transportation     Lack of Transportation (Medical): No     Lack of Transportation (Non-Medical): No   Physical Activity: Not on file   Stress: Not on file   Social Connections: Not on file   Intimate Partner Violence: Not on file   Housing Stability: Low Risk  (7/1/2025)    Housing Stability Vital Sign     Unable to Pay for Housing in the Last Year: No     Number of Times Moved in the Last Year: 0     Homeless in the Last Year: No     Allergies   Allergen Reactions    Hydromorphone      Other reaction(s): eye tearing    Sulfamethoxazole Hives    Trimethoprim Hives       Objective   Visit Vitals  /80 (BP Location: Left upper arm, Patient Position: Sitting)   Pulse 77   Temp 36.4 °C (97.5 °F) (Temporal)   Resp 17   Ht 1.588 m (5' 2.5\")   Wt 83.7 kg (184 lb 9.6 oz)   LMP  (LMP Unknown)   SpO2 98%   BMI 33.23 kg/m²     Physical Exam  Vitals and nursing note reviewed.   Constitutional:       Appearance: Normal appearance.   HENT:      Right Ear: Tympanic membrane normal. There is no impacted cerumen.      Left Ear: Tympanic membrane normal. There is no impacted cerumen.      Nose: Nose normal. No rhinorrhea.      Mouth/Throat:      Mouth: Mucous membranes are moist.      Pharynx: Oropharynx is clear. No posterior oropharyngeal erythema.   Cardiovascular:      Rate and Rhythm: Normal rate and regular rhythm.      Heart " sounds: No murmur heard.  Pulmonary:      Effort: Pulmonary effort is normal.      Breath sounds: Normal breath sounds. No wheezing.   Abdominal:      General: Bowel sounds are normal.      Palpations: Abdomen is soft.      Tenderness: There is no abdominal tenderness.   Musculoskeletal:         General: Normal range of motion.      Cervical back: Normal range of motion and neck supple.   Skin:     General: Skin is warm.      Findings: No rash.   Neurological:      General: No focal deficit present.      Mental Status: She is alert and oriented to person, place, and time.      Cranial Nerves: No cranial nerve deficit.   Psychiatric:         Mood and Affect: Mood normal.         Behavior: Behavior normal.         Assessment/Plan   Problem List Items Addressed This Visit          Endocrine/Metabolic    Hypothyroidism    Relevant Orders    TSH    T4, free       Dermatologic    Other acne       Mental Health    Anxiety    Relevant Medications    buPROPion XL (WELLBUTRIN XL) 150 mg 24 hr tablet     Other Visit Diagnoses         Routine physical examination    -  Primary    Relevant Orders    Comprehensive metabolic panel    TSH    CBC    Lipid panel    Hemoglobin A1c      Need for pneumococcal vaccine        Relevant Orders    Pneumococcal conjugate vaccine 20-valent (PREVNAR) IM      Encounter for screening mammogram for malignant neoplasm of breast        Relevant Orders    BI SCREENING MAMMOGRAM BILATERAL(TOMOSYNTHESIS)        She is doing ok overall. We discussed trying to do better with diet and exercise. I am hoping her new job will allow her some time to get physical activity in her day. We discussed her sleep and anxiety. She does not want an SSRI b/c of possible weight gain and feels she has done trazodone before. We discussed magnesium but she would like to try wellbutrin. I explained this is not my first option for anxiety but she feels she could have some ADD as well so would be open to trying it. She knows  how to take med and side effects. She will update me in few wks. Will check updated labs. Will check mammo. She will schedule colonoscopy. She will get prevnar 20 today. UTD with eye and dental exam. She will schedule derm exam down the line.     Eloisa Domingo, DO  7/2/2025

## 2025-07-24 DIAGNOSIS — E03.9 HYPOTHYROIDISM, UNSPECIFIED TYPE: ICD-10-CM

## 2025-07-24 DIAGNOSIS — F41.9 ANXIETY: ICD-10-CM

## 2025-07-24 RX ORDER — BUPROPION HYDROCHLORIDE 150 MG/1
150 TABLET ORAL DAILY
Qty: 90 TABLET | Refills: 1 | Status: SHIPPED | OUTPATIENT
Start: 2025-07-24 | End: 2026-07-24

## 2025-07-24 RX ORDER — LEVOTHYROXINE SODIUM 175 UG/1
175 TABLET ORAL DAILY
Qty: 90 TABLET | Refills: 3 | Status: SHIPPED | OUTPATIENT
Start: 2025-07-24

## 2025-07-28 LAB
ERYTHROCYTE [DISTWIDTH] IN BLOOD BY AUTOMATED COUNT: 12.2 % (ref 11.7–15.4)
HCT VFR BLD AUTO: 43.5 % (ref 34–46.6)
HGB BLD-MCNC: 15 G/DL (ref 11.1–15.9)
MCH RBC QN AUTO: 32.2 PG (ref 26.6–33)
MCHC RBC AUTO-ENTMCNC: 34.5 G/DL (ref 31.5–35.7)
MCV RBC AUTO: 93 FL (ref 79–97)
PLATELET # BLD AUTO: 220 X10E3/UL (ref 150–450)
RBC # BLD AUTO: 4.66 X10E6/UL (ref 3.77–5.28)
WBC # BLD AUTO: 7.3 X10E3/UL (ref 3.4–10.8)

## 2025-07-29 LAB
ALBUMIN SERPL-MCNC: 4.6 G/DL (ref 3.8–4.9)
ALP SERPL-CCNC: 115 IU/L (ref 44–121)
ALT SERPL-CCNC: 13 IU/L (ref 0–32)
AST SERPL-CCNC: 15 IU/L (ref 0–40)
BILIRUB SERPL-MCNC: 0.5 MG/DL (ref 0–1.2)
BUN SERPL-MCNC: 15 MG/DL (ref 8–27)
BUN/CREAT SERPL: 15 (ref 12–28)
CALCIUM SERPL-MCNC: 9.7 MG/DL (ref 8.7–10.3)
CHLORIDE SERPL-SCNC: 102 MMOL/L (ref 96–106)
CHOLEST SERPL-MCNC: 216 MG/DL (ref 100–199)
CO2 SERPL-SCNC: 19 MMOL/L (ref 20–29)
CREAT SERPL-MCNC: 0.99 MG/DL (ref 0.57–1)
EGFRCR SERPLBLD CKD-EPI 2021: 65 ML/MIN/1.73
GLOBULIN SER CALC-MCNC: 2.5 G/DL (ref 1.5–4.5)
GLUCOSE SERPL-MCNC: 97 MG/DL (ref 70–99)
HBA1C MFR BLD: 5 % (ref 4.8–5.6)
HDLC SERPL-MCNC: 62 MG/DL
LDLC SERPL CALC-MCNC: 131 MG/DL (ref 0–99)
POTASSIUM SERPL-SCNC: 4.4 MMOL/L (ref 3.5–5.2)
PROT SERPL-MCNC: 7.1 G/DL (ref 6–8.5)
SODIUM SERPL-SCNC: 138 MMOL/L (ref 134–144)
T4 FREE SERPL-MCNC: 1.61 NG/DL (ref 0.82–1.77)
TRIGL SERPL-MCNC: 133 MG/DL (ref 0–149)
TSH SERPL DL<=0.005 MIU/L-ACNC: 2.54 UIU/ML (ref 0.45–4.5)
VLDLC SERPL CALC-MCNC: 23 MG/DL (ref 5–40)

## 2025-08-02 DIAGNOSIS — L70.9 ACNE, UNSPECIFIED ACNE TYPE: ICD-10-CM

## 2025-08-02 DIAGNOSIS — E03.9 HYPOTHYROIDISM, UNSPECIFIED TYPE: ICD-10-CM

## 2025-08-04 RX ORDER — SPIRONOLACTONE 100 MG/1
100 TABLET, FILM COATED ORAL DAILY
Qty: 90 TABLET | Refills: 3 | Status: SHIPPED | OUTPATIENT
Start: 2025-08-04

## 2025-08-04 RX ORDER — LEVOTHYROXINE SODIUM 175 UG/1
175 TABLET ORAL DAILY
Qty: 90 TABLET | Refills: 3 | Status: SHIPPED | OUTPATIENT
Start: 2025-08-04 | End: 2025-08-06 | Stop reason: ALTCHOICE

## 2025-08-06 ENCOUNTER — TELEPHONE (OUTPATIENT)
Dept: PRIMARY CARE | Facility: CLINIC | Age: 60
End: 2025-08-06
Payer: COMMERCIAL

## 2025-08-06 RX ORDER — LEVOTHYROXINE SODIUM 175 UG/1
175 TABLET ORAL
Qty: 90 TABLET | Refills: 3 | Status: SHIPPED | OUTPATIENT
Start: 2025-08-06 | End: 2026-08-06

## 2025-08-06 NOTE — TELEPHONE ENCOUNTER
Spoke with pt and states that she used to take Brand and feels like the Brand works better for her. States that she feels better on Brand.